# Patient Record
Sex: FEMALE | Race: WHITE | NOT HISPANIC OR LATINO | Employment: UNEMPLOYED | URBAN - METROPOLITAN AREA
[De-identification: names, ages, dates, MRNs, and addresses within clinical notes are randomized per-mention and may not be internally consistent; named-entity substitution may affect disease eponyms.]

---

## 2017-01-12 ENCOUNTER — TELEPHONE (OUTPATIENT)
Dept: INTERNAL MEDICINE | Facility: CLINIC | Age: 37
End: 2017-01-12

## 2017-01-12 DIAGNOSIS — Z00.00 ANNUAL PHYSICAL EXAM: Primary | ICD-10-CM

## 2017-01-12 NOTE — TELEPHONE ENCOUNTER
----- Message from Ottoniel Tucker sent at 1/12/2017 10:45 AM CST -----  Contact: Self [phone]  Type: Orders Request    What orders/ testing are being requested? Orders to have her Thyroid level check    Is there a future appointment scheduled for the patient with PCP? No    When?    Comments:Please advice    Thanks

## 2017-01-16 ENCOUNTER — TELEPHONE (OUTPATIENT)
Dept: INTERNAL MEDICINE | Facility: CLINIC | Age: 37
End: 2017-01-16

## 2017-01-16 NOTE — TELEPHONE ENCOUNTER
----- Message from Britt Paige sent at 1/16/2017  1:57 PM CST -----  Contact: patient on cell [phone]  lOy, Pt called last week and is having sx which indicate she may be having thyroid problems She currently takes thyroid medication. I see two sets of orders and wanted to be sure it was ok to schedule them. Please call pt to advise if this is all that needs to be scheduled to check her thyroid level.

## 2017-01-17 ENCOUNTER — LAB VISIT (OUTPATIENT)
Dept: LAB | Facility: HOSPITAL | Age: 37
End: 2017-01-17
Attending: INTERNAL MEDICINE
Payer: COMMERCIAL

## 2017-01-17 ENCOUNTER — TELEPHONE (OUTPATIENT)
Dept: INTERNAL MEDICINE | Facility: CLINIC | Age: 37
End: 2017-01-17

## 2017-01-17 DIAGNOSIS — E03.9 HYPOTHYROIDISM: ICD-10-CM

## 2017-01-17 LAB
T4 FREE SERPL-MCNC: 0.84 NG/DL
TSH SERPL DL<=0.005 MIU/L-ACNC: 2.53 UIU/ML

## 2017-01-17 PROCEDURE — 84439 ASSAY OF FREE THYROXINE: CPT

## 2017-01-17 PROCEDURE — 84443 ASSAY THYROID STIM HORMONE: CPT

## 2017-01-17 PROCEDURE — 36415 COLL VENOUS BLD VENIPUNCTURE: CPT | Mod: PO

## 2017-01-20 ENCOUNTER — TELEPHONE (OUTPATIENT)
Dept: INTERNAL MEDICINE | Facility: CLINIC | Age: 37
End: 2017-01-20

## 2017-01-20 RX ORDER — LEVOTHYROXINE SODIUM 50 UG/1
50 TABLET ORAL DAILY
Qty: 90 TABLET | Refills: 3 | Status: SHIPPED | OUTPATIENT
Start: 2017-01-20 | End: 2018-04-23 | Stop reason: SDUPTHER

## 2017-02-13 ENCOUNTER — TELEPHONE (OUTPATIENT)
Dept: OBSTETRICS AND GYNECOLOGY | Facility: CLINIC | Age: 37
End: 2017-02-13

## 2017-02-13 NOTE — TELEPHONE ENCOUNTER
Pt coming in for annual and wanted to discuss a possible hyst with Dr. Martin.  C/o severe cramping.  Took 4 advil that didn't help.  Rescheduled annual to 2/23.

## 2017-02-13 NOTE — TELEPHONE ENCOUNTER
Veronica pt - pt is scheduled for an annual on 3/14. She said she has been having problems with her period and the pain is becoming unbearable. She would like to discuss the option of a hysterectomy with .

## 2018-04-23 RX ORDER — LEVOTHYROXINE SODIUM 50 UG/1
50 TABLET ORAL DAILY
Qty: 90 TABLET | Refills: 0 | Status: SHIPPED | OUTPATIENT
Start: 2018-04-23 | End: 2018-06-26

## 2018-05-30 ENCOUNTER — TELEPHONE (OUTPATIENT)
Dept: OBSTETRICS AND GYNECOLOGY | Facility: CLINIC | Age: 38
End: 2018-05-30

## 2018-05-30 DIAGNOSIS — R10.2 PELVIC PAIN: Primary | ICD-10-CM

## 2018-05-30 NOTE — TELEPHONE ENCOUNTER
Dr glez pt calling, pt has been having a dull right side pelvic pain around her ovaries. Pt # [phone]

## 2018-05-30 NOTE — TELEPHONE ENCOUNTER
Veronica pt- states she has been experiencing a dull achy pain around her right ovary for a couple of weeks. Pain comes and goes but is not unbearable. Pt not on OCPs.    Pt scheduled annual appt earlier today for 6/6/18. Pt's last annual was 1/28/2016.    Notified pt that she should come in for annual and speak to Dr. Martin and then return for u/s + appt if Dr. Martin thought that was appropriate. Pt scheduled for u/s +appt on 6/18, aware of u/s prep.

## 2018-06-06 ENCOUNTER — OFFICE VISIT (OUTPATIENT)
Dept: OBSTETRICS AND GYNECOLOGY | Facility: CLINIC | Age: 38
End: 2018-06-06
Payer: COMMERCIAL

## 2018-06-06 VITALS
BODY MASS INDEX: 31.16 KG/M2 | SYSTOLIC BLOOD PRESSURE: 122 MMHG | WEIGHT: 193.88 LBS | DIASTOLIC BLOOD PRESSURE: 80 MMHG | HEIGHT: 66 IN

## 2018-06-06 DIAGNOSIS — Z12.31 ENCOUNTER FOR MAMMOGRAM TO ESTABLISH BASELINE MAMMOGRAM: ICD-10-CM

## 2018-06-06 DIAGNOSIS — Z11.51 SPECIAL SCREENING EXAMINATION FOR HUMAN PAPILLOMAVIRUS (HPV): ICD-10-CM

## 2018-06-06 DIAGNOSIS — Z12.31 VISIT FOR SCREENING MAMMOGRAM: ICD-10-CM

## 2018-06-06 DIAGNOSIS — Z11.51 SCREENING FOR HUMAN PAPILLOMAVIRUS: ICD-10-CM

## 2018-06-06 DIAGNOSIS — Z01.419 ENCOUNTER FOR GYNECOLOGICAL EXAMINATION: ICD-10-CM

## 2018-06-06 DIAGNOSIS — Z13.21 ENCOUNTER FOR VITAMIN DEFICIENCY SCREENING: ICD-10-CM

## 2018-06-06 DIAGNOSIS — Z12.4 SCREENING FOR CERVICAL CANCER: ICD-10-CM

## 2018-06-06 DIAGNOSIS — Z12.4 SCREENING FOR MALIGNANT NEOPLASM OF CERVIX: Primary | ICD-10-CM

## 2018-06-06 DIAGNOSIS — N94.6 DYSMENORRHEA: ICD-10-CM

## 2018-06-06 DIAGNOSIS — N92.0 MENORRHAGIA WITH REGULAR CYCLE: ICD-10-CM

## 2018-06-06 DIAGNOSIS — E03.9 ACQUIRED HYPOTHYROIDISM: ICD-10-CM

## 2018-06-06 DIAGNOSIS — Z00.00 PERIODIC HEALTH ASSESSMENT, GENERAL SCREENING, ADULT: ICD-10-CM

## 2018-06-06 PROCEDURE — 88175 CYTOPATH C/V AUTO FLUID REDO: CPT

## 2018-06-06 PROCEDURE — 87624 HPV HI-RISK TYP POOLED RSLT: CPT

## 2018-06-06 PROCEDURE — 99395 PREV VISIT EST AGE 18-39: CPT | Mod: S$GLB,,, | Performed by: OBSTETRICS & GYNECOLOGY

## 2018-06-06 PROCEDURE — 99999 PR PBB SHADOW E&M-EST. PATIENT-LVL IV: CPT | Mod: PBBFAC,,, | Performed by: OBSTETRICS & GYNECOLOGY

## 2018-06-06 RX ORDER — PHENTERMINE HYDROCHLORIDE 37.5 MG/1
18.75 TABLET ORAL 2 TIMES DAILY
Refills: 0 | COMMUNITY
Start: 2018-05-16 | End: 2018-06-26

## 2018-06-06 RX ORDER — PROGESTERONE 100 MG/1
100 CAPSULE ORAL DAILY
Qty: 60 CAPSULE | Refills: 3 | Status: SHIPPED | OUTPATIENT
Start: 2018-06-06 | End: 2019-05-15 | Stop reason: SDUPTHER

## 2018-06-06 NOTE — PROGRESS NOTES
Subjective:       Patient ID: Monica Javier is a 37 y.o. female.    Chief Complaint:  Well Woman (Annual Exam   --  Last Pap/Hpv 13, Negative )      History of Present Illness.  Monica Javier is a 37 y.o. female.  She has no breast or urinary symptoms.  She has no menorrhagia, oligomenorrhea, bleeding betweeen menses, postcoital bleeding, dysmenorrhea, pelvic pain, dyspareunia, vaginal dryness, vaginal discharge, or sexual complaints.  She complains of heavy periods with clots and bad cramps.  They last 4-5 days and are regular.  She has been having hip pain recently.    GYN & OB History  Patient's last menstrual period was 2018 (exact date).   Date of Last Pap: No result found    OB History    Para Term  AB Living   4 2 2   2 2   SAB TAB Ectopic Multiple Live Births   1       2      # Outcome Date GA Lbr Filemon/2nd Weight Sex Delivery Anes PTL Lv   4 Term 14 39w0d  3.685 kg (8 lb 2 oz) M CS-Unspec Spinal  NARENDRA   3 SAB 10/08/12 10w0d       FD      Birth Comments: Suction D&C (46 XX karyotype)    2 Molar 11              Birth Comments: Suction D&C   1 Term 09 40w0d  3.345 kg (7 lb 6 oz) F CS-Unspec Spinal  NARENDRA      Complications: Failure to Progress in Second Stage      Obstetric Comments   menarche 14       Past Medical History:   Diagnosis Date    Asthma     Bicornuate uterus     History of MTHFR mutation     1 copy of MTHFR C677T mutation 1 copy MTHFR [id number] mutation    Hypothyroidism     Hypo    Thrombophilia     1 copy of MTHFR C677T mutation 1 copy MTHFR [id number] mutation     Past Surgical History:   Procedure Laterality Date     SECTION  , 2014    x 2    DILATION AND CURETTAGE OF UTERUS  2011    Partial Mole    DILATION AND CURETTAGE OF UTERUS  10/08/2012    Missed Ab (46 XX karatype)    TUBAL LIGATION      with last delivery      Family History   Problem Relation Age of Onset    Diabetes Father     Hypertension Father      "Hyperlipidemia Father     Diabetes Sister         juvenile    Cancer Paternal Grandfather         throat; smoker and alcoholic    Throat cancer Paternal Grandfather     Hypertension Mother     Breast cancer Neg Hx     Colon cancer Neg Hx     Ovarian cancer Neg Hx      Social History   Substance Use Topics    Smoking status: Never Smoker    Smokeless tobacco: Never Used    Alcohol use 1.8 oz/week     3 Glasses of wine per week      Comment: Social        Current Outpatient Prescriptions:     ADIPEX-P 37.5 mg tablet, Take 18.75 mg by mouth 2 (two) times daily., Disp: , Rfl: 0    levothyroxine (SYNTHROID) 50 MCG tablet, TAKE 1 TABLET (50 MCG TOTAL) BY MOUTH ONCE DAILY., Disp: 90 tablet, Rfl: 0    progesterone (PROMETRIUM) 100 MG capsule, Take 1 capsule (100 mg total) by mouth once daily., Disp: 60 capsule, Rfl: 3    Review of patient's allergies indicates:  No Known Allergies    Review of Systems  Review of Systems   Constitutional: Negative for fatigue.   HENT: Negative for trouble swallowing.    Eyes: Negative for visual disturbance.   Respiratory: Negative for cough and shortness of breath.    Cardiovascular: Negative for chest pain.   Gastrointestinal: Negative for abdominal distention, abdominal pain, blood in stool, nausea and vomiting.   Genitourinary: Negative for difficulty urinating, dyspareunia, dysuria, flank pain, frequency, hematuria, pelvic pain, urgency, vaginal bleeding, vaginal discharge and vaginal pain.   Musculoskeletal: Positive for arthralgias.   Skin: Negative for rash.   Neurological: Negative for dizziness and headaches.   Psychiatric/Behavioral: Negative for sleep disturbance. The patient is not nervous/anxious.         Objective:     Vitals:    06/06/18 1425   BP: 122/80   Weight: 87.9 kg (193 lb 14.3 oz)   Height: 5' 6" (1.676 m)   PainSc: 0-No pain     Body mass index is 31.3 kg/m².      Physical Exam:   Constitutional: She is oriented to person, place, and time. Vital signs " are normal. She appears well-developed and well-nourished.    HENT:   Head: Normocephalic.     Neck: Normal range of motion. No thyromegaly present.     Pulmonary/Chest: Right breast exhibits no mass, no nipple discharge, no skin change, no tenderness and no swelling. Left breast exhibits no mass, no nipple discharge, no skin change, no tenderness and no swelling. Breasts are symmetrical.        Abdominal: Soft. Normal appearance and bowel sounds are normal. She exhibits no distension. There is no tenderness.     Genitourinary: Vagina normal and uterus normal. Pelvic exam was performed with patient supine. There is no rash, tenderness, lesion or injury on the right labia. There is no rash, tenderness, lesion or injury on the left labia. Cervix is normal. Right adnexum displays no mass, no tenderness and no fullness. Left adnexum displays no mass, no tenderness and no fullness. No erythema in the vagina. No vaginal discharge found. Cervix exhibits no motion tenderness and no discharge.           Musculoskeletal: Normal range of motion.      Lymphadenopathy:        Right: No inguinal and no supraclavicular adenopathy present.        Left: No inguinal and no supraclavicular adenopathy present.    Neurological: She is alert and oriented to person, place, and time.    Skin: Skin is warm and dry.    Psychiatric: She has a normal mood and affect.        Assessment/ Plan:     Screening for malignant neoplasm of cervix  -     Liquid-based pap smear, screening    Screening for human papillomavirus  -     HPV High Risk Genotypes, PCR    Encounter for mammogram to establish baseline mammogram  -     Mammo Digital Screening Bilat with Tomosynthesis CAD; Future; Expected date: 06/06/2018    Encounter for gynecological examination    Screening for cervical cancer    Special screening examination for human papillomavirus (HPV)    Visit for screening mammogram    Acquired hypothyroidism  -     Ambulatory Referral to Endocrinology  -      TSH; Future; Expected date: 06/06/2018  -     T4, free; Future; Expected date: 06/06/2018  -     T3, free; Future; Expected date: 06/06/2018    Periodic health assessment, general screening, adult  -     CBC auto differential; Future  -     Comprehensive metabolic panel; Future; Expected date: 06/06/2018  -     Hemoglobin A1c; Future; Expected date: 06/06/2018  -     Lipid panel; Future; Expected date: 06/06/2018  -     TSH; Future; Expected date: 06/06/2018  -     T4, free; Future; Expected date: 06/06/2018  -     T3, free; Future; Expected date: 06/06/2018    Encounter for vitamin deficiency screening  -     Vitamin D; Future; Expected date: 06/06/2018  -     Vitamin B12; Future; Expected date: 06/06/2018    Menorrhagia with regular cycle  -     US Pelvis Comp with Transvag NON-OB (xpd; Future; Expected date: 06/06/2018  -     progesterone (PROMETRIUM) 100 MG capsule; Take 1 capsule (100 mg total) by mouth once daily.  Dispense: 60 capsule; Refill: 3    Dysmenorrhea      Titrate progesterone to effect for menorrhagia control or side effects of drowsiness.  Will start at 100 mg and can increase by 100 mg.  DHEA SR 5 mg titrate by 5 mg q month until effect or to 50 mg.  MTHFR support  Nature Made fish oil 1200 + Vitamin D3 1000 IU 2 tabs twice daily.  Routine pap smears.  Self breast exam  Diet and exercise discussed.  Follow-up with me for u/s and to discuss labs.

## 2018-06-07 ENCOUNTER — PATIENT MESSAGE (OUTPATIENT)
Dept: OBSTETRICS AND GYNECOLOGY | Facility: CLINIC | Age: 38
End: 2018-06-07

## 2018-06-12 LAB
HPV HR 12 DNA CVX QL NAA+PROBE: NEGATIVE
HPV16 AG SPEC QL: NEGATIVE
HPV18 DNA SPEC QL NAA+PROBE: NEGATIVE

## 2018-06-18 ENCOUNTER — HOSPITAL ENCOUNTER (OUTPATIENT)
Dept: RADIOLOGY | Facility: OTHER | Age: 38
Discharge: HOME | End: 2018-06-18
Attending: OBSTETRICS & GYNECOLOGY
Payer: COMMERCIAL

## 2018-06-18 ENCOUNTER — OFFICE VISIT (OUTPATIENT)
Dept: OBSTETRICS AND GYNECOLOGY | Facility: CLINIC | Age: 38
End: 2018-06-18
Attending: OBSTETRICS & GYNECOLOGY
Payer: COMMERCIAL

## 2018-06-18 VITALS
DIASTOLIC BLOOD PRESSURE: 84 MMHG | HEIGHT: 67 IN | SYSTOLIC BLOOD PRESSURE: 122 MMHG | WEIGHT: 194.56 LBS | BODY MASS INDEX: 30.54 KG/M2

## 2018-06-18 DIAGNOSIS — N92.0 MENORRHAGIA WITH REGULAR CYCLE: Primary | ICD-10-CM

## 2018-06-18 DIAGNOSIS — R10.2 PELVIC PAIN: ICD-10-CM

## 2018-06-18 DIAGNOSIS — N94.6 DYSMENORRHEA: ICD-10-CM

## 2018-06-18 PROCEDURE — 99999 PR PBB SHADOW E&M-EST. PATIENT-LVL III: CPT | Mod: PBBFAC,,, | Performed by: OBSTETRICS & GYNECOLOGY

## 2018-06-18 PROCEDURE — 76830 TRANSVAGINAL US NON-OB: CPT | Mod: 26,,, | Performed by: RADIOLOGY

## 2018-06-18 PROCEDURE — 76830 TRANSVAGINAL US NON-OB: CPT | Mod: TC

## 2018-06-18 PROCEDURE — 76856 US EXAM PELVIC COMPLETE: CPT | Mod: 26,,, | Performed by: RADIOLOGY

## 2018-06-18 PROCEDURE — 99213 OFFICE O/P EST LOW 20 MIN: CPT | Mod: S$GLB,,, | Performed by: OBSTETRICS & GYNECOLOGY

## 2018-06-18 PROCEDURE — 3008F BODY MASS INDEX DOCD: CPT | Mod: CPTII,S$GLB,, | Performed by: OBSTETRICS & GYNECOLOGY

## 2018-06-18 NOTE — PROGRESS NOTES
"Pt is 38 y.o. here for evaluation of menorrhagia and dysmenorrhea. The pain is  10/10 in intensity first 2 days.  Onset was years ago occurring after she had BTL.  They are also very heavy the first 3-4 days with clots also since BTL.  She is going to start progesterone to see if that helps the flow.  Symptoms have been gradually worsening since. Aggravating factors: none. Alleviating factors: Advil takes the edge off.  Associated symptoms: none. The patient denies constipation, diarrhea, nausea, sweats and vomiting. Risk factors for pelvic/abdominal pain include prior pelvic surgery and bicornuate uterus.. Patient's last menstrual period was 06/10/2018 (exact date).  An U/S was done today but there was no mention of her bicornuate uterus.  I spoke to the radiologist and he said it was hard for the tech to get "good views".      OB History    Para Term  AB Living   4 2 2   2 2   SAB TAB Ectopic Multiple Live Births   1       2      # Outcome Date GA Lbr Filemon/2nd Weight Sex Delivery Anes PTL Lv   4 Term 14 39w0d  3.685 kg (8 lb 2 oz) M CS-Unspec Spinal  NARENDRA   3 SAB 10/08/12 10w0d       FD      Birth Comments: Suction D&C (46 XX karyotype)    2 Molar 11              Birth Comments: Suction D&C   1 Term 09 40w0d  3.345 kg (7 lb 6 oz) F CS-Unspec Spinal  NARENDRA      Complications: Failure to Progress in Second Stage      Obstetric Comments   menarche 14     Past Medical History:   Diagnosis Date    Asthma     Bicornuate uterus     History of MTHFR mutation     1 copy of MTHFR C677T mutation 1 copy MTHFR [id number] mutation    Hypothyroidism     Hypo    Thrombophilia     1 copy of MTHFR C677T mutation 1 copy MTHFR [id number] mutation     Past Surgical History:   Procedure Laterality Date     SECTION  , 2014    x 2    DILATION AND CURETTAGE OF UTERUS  2011    Partial Mole    DILATION AND CURETTAGE OF UTERUS  10/08/2012    Missed Ab (46 XX karatype)    TUBAL LIGATION      " with last delivery      Family History   Problem Relation Age of Onset    Diabetes Father     Hypertension Father     Hyperlipidemia Father     Diabetes Sister         juvenile    Kidney failure Sister     Cancer Paternal Grandfather         throat; smoker and alcoholic    Throat cancer Paternal Grandfather     Hypertension Mother     Breast cancer Neg Hx     Colon cancer Neg Hx     Ovarian cancer Neg Hx      Social History     Social History    Marital status:      Spouse name: N/A    Number of children: N/A    Years of education: N/A     Social History Main Topics    Smoking status: Never Smoker    Smokeless tobacco: Never Used    Alcohol use 1.8 oz/week     3 Glasses of wine per week      Comment: Social     Drug use: No    Sexual activity: Yes     Partners: Male     Birth control/ protection: Surgical      Comment: :    BTL       Other Topics Concern    None     Social History Narrative    None     Review of patient's allergies indicates:  No Known Allergies  Current Outpatient Prescriptions on File Prior to Visit   Medication Sig Dispense Refill    ADIPEX-P 37.5 mg tablet Take 18.75 mg by mouth 2 (two) times daily.  0    levothyroxine (SYNTHROID) 50 MCG tablet TAKE 1 TABLET (50 MCG TOTAL) BY MOUTH ONCE DAILY. 90 tablet 0    progesterone (PROMETRIUM) 100 MG capsule Take 1 capsule (100 mg total) by mouth once daily. 60 capsule 3     No current facility-administered medications on file prior to visit.        Review of Systems:  General: No fever, chills, fatigue or weight loss.  Chest: No chest pain, shortness of breath, or palpitations.  Breast: No pain, masses, or nipple discharge.  Vulva: No pain, lesions, or itching.  Vagina: No relaxation, pain, itching, discharge, or lesions.  Abdomen: No pain, nausea, vomiting, diarrhea, or constipation.  Urinary: No incontinence, nocturia, frequency, or dysuria.  Extremities:  No leg cramps, edema, or calf pain.  Neurologic: No  "headaches, dizziness, or visual changes.    Vitals:  Vitals:    06/18/18 0942   BP: 122/84   Weight: 88.3 kg (194 lb 8.9 oz)   Height: 5' 7" (1.702 m)   PainSc: 0-No pain     Body mass index is 30.47 kg/m².    Assessment and Plan:  Menorrhagia with regular cycle  -     US Pelvis Comp with Transvag NON-OB (xpd; Future; Expected date: 06/18/2018    Dysmenorrhea  -     US Pelvis Comp with Transvag NON-OB (xpd; Future; Expected date: 06/18/2018    Start progesterone 100 mg QHS  Menstrual diary  Will have [facility] redo U/S.  Radiologist agreed not to charge for the scan today because it was not adequate.  I spent 15 minutes face to face with the patient today.  "

## 2018-06-20 ENCOUNTER — LAB VISIT (OUTPATIENT)
Dept: LAB | Facility: HOSPITAL | Age: 38
End: 2018-06-20
Attending: OBSTETRICS & GYNECOLOGY
Payer: COMMERCIAL

## 2018-06-20 DIAGNOSIS — Z13.21 ENCOUNTER FOR VITAMIN DEFICIENCY SCREENING: ICD-10-CM

## 2018-06-20 DIAGNOSIS — Z00.00 PERIODIC HEALTH ASSESSMENT, GENERAL SCREENING, ADULT: ICD-10-CM

## 2018-06-20 DIAGNOSIS — E03.9 ACQUIRED HYPOTHYROIDISM: ICD-10-CM

## 2018-06-20 LAB
25(OH)D3+25(OH)D2 SERPL-MCNC: 27 NG/ML
ALBUMIN SERPL BCP-MCNC: 4 G/DL
ALP SERPL-CCNC: 87 U/L
ALT SERPL W/O P-5'-P-CCNC: 22 U/L
ANION GAP SERPL CALC-SCNC: 8 MMOL/L
AST SERPL-CCNC: 20 U/L
BASOPHILS # BLD AUTO: 0.06 K/UL
BASOPHILS NFR BLD: 0.8 %
BILIRUB SERPL-MCNC: 0.2 MG/DL
BUN SERPL-MCNC: 13 MG/DL
CALCIUM SERPL-MCNC: 9.6 MG/DL
CHLORIDE SERPL-SCNC: 105 MMOL/L
CHOLEST SERPL-MCNC: 188 MG/DL
CHOLEST/HDLC SERPL: 3.7 {RATIO}
CO2 SERPL-SCNC: 26 MMOL/L
CREAT SERPL-MCNC: 0.7 MG/DL
DIFFERENTIAL METHOD: ABNORMAL
EOSINOPHIL # BLD AUTO: 0.2 K/UL
EOSINOPHIL NFR BLD: 2.6 %
ERYTHROCYTE [DISTWIDTH] IN BLOOD BY AUTOMATED COUNT: 12.3 %
EST. GFR  (AFRICAN AMERICAN): >60 ML/MIN/1.73 M^2
EST. GFR  (NON AFRICAN AMERICAN): >60 ML/MIN/1.73 M^2
ESTIMATED AVG GLUCOSE: 85 MG/DL
GLUCOSE SERPL-MCNC: 90 MG/DL
HBA1C MFR BLD HPLC: 4.6 %
HCT VFR BLD AUTO: 38.6 %
HDLC SERPL-MCNC: 51 MG/DL
HDLC SERPL: 27.1 %
HGB BLD-MCNC: 12.9 G/DL
IMM GRANULOCYTES # BLD AUTO: 0.03 K/UL
IMM GRANULOCYTES NFR BLD AUTO: 0.4 %
LDLC SERPL CALC-MCNC: 108.2 MG/DL
LYMPHOCYTES # BLD AUTO: 2.3 K/UL
LYMPHOCYTES NFR BLD: 31.5 %
MCH RBC QN AUTO: 31.2 PG
MCHC RBC AUTO-ENTMCNC: 33.4 G/DL
MCV RBC AUTO: 93 FL
MONOCYTES # BLD AUTO: 0.6 K/UL
MONOCYTES NFR BLD: 7.9 %
NEUTROPHILS # BLD AUTO: 4.2 K/UL
NEUTROPHILS NFR BLD: 56.8 %
NONHDLC SERPL-MCNC: 137 MG/DL
NRBC BLD-RTO: 0 /100 WBC
PLATELET # BLD AUTO: 322 K/UL
PMV BLD AUTO: 10 FL
POTASSIUM SERPL-SCNC: 4.4 MMOL/L
PROT SERPL-MCNC: 7.4 G/DL
RBC # BLD AUTO: 4.14 M/UL
SODIUM SERPL-SCNC: 139 MMOL/L
T3FREE SERPL-MCNC: 2.4 PG/ML
T4 FREE SERPL-MCNC: 0.75 NG/DL
TRIGL SERPL-MCNC: 144 MG/DL
TSH SERPL DL<=0.005 MIU/L-ACNC: 5.33 UIU/ML
VIT B12 SERPL-MCNC: 367 PG/ML
WBC # BLD AUTO: 7.33 K/UL

## 2018-06-20 PROCEDURE — 84439 ASSAY OF FREE THYROXINE: CPT

## 2018-06-20 PROCEDURE — 82306 VITAMIN D 25 HYDROXY: CPT

## 2018-06-20 PROCEDURE — 85025 COMPLETE CBC W/AUTO DIFF WBC: CPT

## 2018-06-20 PROCEDURE — 80061 LIPID PANEL: CPT

## 2018-06-20 PROCEDURE — 83036 HEMOGLOBIN GLYCOSYLATED A1C: CPT

## 2018-06-20 PROCEDURE — 84443 ASSAY THYROID STIM HORMONE: CPT

## 2018-06-20 PROCEDURE — 84481 FREE ASSAY (FT-3): CPT

## 2018-06-20 PROCEDURE — 80053 COMPREHEN METABOLIC PANEL: CPT

## 2018-06-20 PROCEDURE — 82607 VITAMIN B-12: CPT

## 2018-06-26 ENCOUNTER — LAB VISIT (OUTPATIENT)
Dept: LAB | Facility: HOSPITAL | Age: 38
End: 2018-06-26
Attending: OBSTETRICS & GYNECOLOGY
Payer: COMMERCIAL

## 2018-06-26 ENCOUNTER — OFFICE VISIT (OUTPATIENT)
Dept: OBSTETRICS AND GYNECOLOGY | Facility: CLINIC | Age: 38
End: 2018-06-26
Payer: COMMERCIAL

## 2018-06-26 VITALS
WEIGHT: 200.63 LBS | HEIGHT: 67 IN | SYSTOLIC BLOOD PRESSURE: 134 MMHG | BODY MASS INDEX: 31.49 KG/M2 | DIASTOLIC BLOOD PRESSURE: 86 MMHG

## 2018-06-26 DIAGNOSIS — N92.0 MENORRHAGIA WITH REGULAR CYCLE: ICD-10-CM

## 2018-06-26 DIAGNOSIS — N94.6 DYSMENORRHEA: ICD-10-CM

## 2018-06-26 DIAGNOSIS — E03.9 ACQUIRED HYPOTHYROIDISM: ICD-10-CM

## 2018-06-26 DIAGNOSIS — E03.9 ACQUIRED HYPOTHYROIDISM: Primary | ICD-10-CM

## 2018-06-26 LAB — THYROPEROXIDASE IGG SERPL-ACNC: 307.1 IU/ML

## 2018-06-26 PROCEDURE — 99499 UNLISTED E&M SERVICE: CPT | Mod: S$GLB,,, | Performed by: OBSTETRICS & GYNECOLOGY

## 2018-06-26 PROCEDURE — 99999 PR PBB SHADOW E&M-EST. PATIENT-LVL III: CPT | Mod: PBBFAC,,, | Performed by: OBSTETRICS & GYNECOLOGY

## 2018-06-26 PROCEDURE — 3008F BODY MASS INDEX DOCD: CPT | Mod: CPTII,S$GLB,, | Performed by: OBSTETRICS & GYNECOLOGY

## 2018-06-26 PROCEDURE — 86376 MICROSOMAL ANTIBODY EACH: CPT

## 2018-06-26 RX ORDER — LEVOTHYROXINE SODIUM 75 UG/1
75 TABLET ORAL DAILY
Qty: 30 TABLET | Refills: 11 | Status: SHIPPED | OUTPATIENT
Start: 2018-06-26 | End: 2018-07-25 | Stop reason: SDUPTHER

## 2018-06-26 RX ORDER — IBUPROFEN 800 MG/1
800 TABLET ORAL EVERY 8 HOURS PRN
Qty: 60 TABLET | Refills: 6 | Status: SHIPPED | OUTPATIENT
Start: 2018-06-26 | End: 2019-06-26

## 2018-06-26 NOTE — PROGRESS NOTES
History of Present Illness:  Patient is a 38 y.o. female who complains of heavy and painful periods. The pain is  10/10 in intensity first 2 days.  Onset was years ago occurring after she had BTL.  They are also very heavy the first 3-4 days with clots also since BTL.  She just started progesterone 100 mg 1 week ago.  Her TSH is elevated at 5.3.  She currently takes 50 mcg daily of synthroid.  She was supposed to see an endocrinologist 2 years ago per PCP but never did due to scheduling issues.  She also had a thyroid U/d which showed a nodule.  The report said a FNA was recommended but the patient was unaware. Patient's last menstrual period was 06/10/2018 (exact date).    Trans abdominal and transvaginal ultrasound of the pelvis:  6/26/18  FINDINGS:  The uterus is measures approximately 8.4 cm in length by 5.6 x 6.7 cm in transverse dimension.  The endometrium measures the endometrium is poorly visualized measuring approximately 1.2 cm in greatest thickness with questioned bicornuate or septate uterus.  Right ovary measures 3.2 by 2.7 x 2.2 cm with a few cystic follicles.  Left ovary is identified measuring 3.3 by 2.8 by 2.6 cm with a few cystic follicles.  There is a 2.2 cm probable dominant follicle  There is arterial and venous flow identified in the ovaries bilaterally.  No adnexal mass or free fluid in the pelvis.   Impression       Normal size uterus with poorly visualized endometrium measuring approximately 1.2 cm in thickness.  There is questioned bicornuate or septated configuration of the uterus.  This could be further evaluate with MR imaging.  No adnexal mass or free fluid in the pelvis with 2.2 cm left ovarian dominant follicle.       Lab Visit on 06/20/2018   Component Date Value Ref Range Status    WBC 06/20/2018 7.33  3.90 - 12.70 K/uL Final    RBC 06/20/2018 4.14  4.00 - 5.40 M/uL Final    Hemoglobin 06/20/2018 12.9  12.0 - 16.0 g/dL Final    Hematocrit 06/20/2018 38.6  37.0 - 48.5 % Final     MCV 06/20/2018 93  82 - 98 fL Final    MCH 06/20/2018 31.2* 27.0 - 31.0 pg Final    MCHC 06/20/2018 33.4  32.0 - 36.0 g/dL Final    RDW 06/20/2018 12.3  11.5 - 14.5 % Final    Platelets 06/20/2018 322  150 - 350 K/uL Final    MPV 06/20/2018 10.0  9.2 - 12.9 fL Final    Immature Granulocytes 06/20/2018 0.4  0.0 - 0.5 % Final    Gran # (ANC) 06/20/2018 4.2  1.8 - 7.7 K/uL Final    Immature Grans (Abs) 06/20/2018 0.03  0.00 - 0.04 K/uL Final    Lymph # 06/20/2018 2.3  1.0 - 4.8 K/uL Final    Mono # 06/20/2018 0.6  0.3 - 1.0 K/uL Final    Eos # 06/20/2018 0.2  0.0 - 0.5 K/uL Final    Baso # 06/20/2018 0.06  0.00 - 0.20 K/uL Final    nRBC 06/20/2018 0  0 /100 WBC Final    Gran% 06/20/2018 56.8  38.0 - 73.0 % Final    Lymph% 06/20/2018 31.5  18.0 - 48.0 % Final    Mono% 06/20/2018 7.9  4.0 - 15.0 % Final    Eosinophil% 06/20/2018 2.6  0.0 - 8.0 % Final    Basophil% 06/20/2018 0.8  0.0 - 1.9 % Final    Differential Method 06/20/2018 Automated   Final    Sodium 06/20/2018 139  136 - 145 mmol/L Final    Potassium 06/20/2018 4.4  3.5 - 5.1 mmol/L Final    Chloride 06/20/2018 105  95 - 110 mmol/L Final    CO2 06/20/2018 26  23 - 29 mmol/L Final    Glucose 06/20/2018 90  70 - 110 mg/dL Final    BUN, Bld 06/20/2018 13  6 - 20 mg/dL Final    Creatinine 06/20/2018 0.7  0.5 - 1.4 mg/dL Final    Calcium 06/20/2018 9.6  8.7 - 10.5 mg/dL Final    Total Protein 06/20/2018 7.4  6.0 - 8.4 g/dL Final    Albumin 06/20/2018 4.0  3.5 - 5.2 g/dL Final    Total Bilirubin 06/20/2018 0.2  0.1 - 1.0 mg/dL Final    Alkaline Phosphatase 06/20/2018 87  55 - 135 U/L Final    AST 06/20/2018 20  10 - 40 U/L Final    ALT 06/20/2018 22  10 - 44 U/L Final    Anion Gap 06/20/2018 8  8 - 16 mmol/L Final    eGFR if African American 06/20/2018 >60.0  >60 mL/min/1.73 m^2 Final    eGFR if non African American 06/20/2018 >60.0  >60 mL/min/1.73 m^2 Final    Hemoglobin A1C 06/20/2018 4.6  4.0 - 5.6 % Final    Estimated Avg  Glucose 2018 85  68 - 131 mg/dL Final    Cholesterol 2018 188  120 - 199 mg/dL Final    Triglycerides 2018 144  30 - 150 mg/dL Final    HDL 2018 51  40 - 75 mg/dL Final    LDL Cholesterol 2018 108.2  63.0 - 159.0 mg/dL Final    HDL/Chol Ratio 2018 27.1  20.0 - 50.0 % Final    Total Cholesterol/HDL Ratio 2018 3.7  2.0 - 5.0 Final    Non-HDL Cholesterol 2018 137  mg/dL Final    TSH 2018 5.333* 0.400 - 4.000 uIU/mL Final    Free T4 2018 0.75  0.71 - 1.51 ng/dL Final    T3, Free 2018 2.4  2.3 - 4.2 pg/mL Final    Vit D, 25-Hydroxy 2018 27* 30 - 96 ng/mL Final    Vitamin B-12 2018 367  210 - 950 pg/mL Final   Office Visit on 2018   Component Date Value Ref Range Status    HPV High Risk type 16, PCR 2018 Negative  Negative Final    HPV High Risk type 18, PCR 2018 Negative  Negative Final    HPV other High Risk types, PCR 2018 Negative  Negative Final       Past Medical History:   Diagnosis Date    Asthma     Bicornuate uterus     History of MTHFR mutation     1 copy of MTHFR C677T mutation 1 copy MTHFR [id number] mutation    Hypothyroidism     Hypo    Thrombophilia     1 copy of MTHFR C677T mutation 1 copy MTHFR [id number] mutation     Past Surgical History:   Procedure Laterality Date     SECTION  2009, 2014    x 2    DILATION AND CURETTAGE OF UTERUS  2011    Partial Mole    DILATION AND CURETTAGE OF UTERUS  10/08/2012    Missed Ab (46 XX karatype)    TUBAL LIGATION      with last delivery      Family History   Problem Relation Age of Onset    Diabetes Father     Hypertension Father     Hyperlipidemia Father     Diabetes Sister         juvenile    Kidney failure Sister     Cancer Paternal Grandfather         throat; smoker and alcoholic    Throat cancer Paternal Grandfather     Hypertension Mother     Breast cancer Neg Hx     Colon cancer Neg Hx     Ovarian cancer Neg Hx   "    Social History     Social History    Marital status:      Spouse name: N/A    Number of children: N/A    Years of education: N/A     Social History Main Topics    Smoking status: Never Smoker    Smokeless tobacco: Never Used    Alcohol use 1.8 oz/week     3 Glasses of wine per week      Comment: Social     Drug use: No    Sexual activity: Yes     Partners: Male     Birth control/ protection: Surgical      Comment: :    BTL       Other Topics Concern    None     Social History Narrative    None     Review of patient's allergies indicates:  No Known Allergies  Current Outpatient Prescriptions on File Prior to Visit   Medication Sig Dispense Refill    mv,Ca,min/iron/FA/guarana/caff (ONE-A-DAY WOMEN'S ACTIVE ORAL) Take by mouth.      progesterone (PROMETRIUM) 100 MG capsule Take 1 capsule (100 mg total) by mouth once daily. 60 capsule 3    [DISCONTINUED] levothyroxine (SYNTHROID) 50 MCG tablet TAKE 1 TABLET (50 MCG TOTAL) BY MOUTH ONCE DAILY. 90 tablet 0    [DISCONTINUED] ADIPEX-P 37.5 mg tablet Take 18.75 mg by mouth 2 (two) times daily.  0     No current facility-administered medications on file prior to visit.        Review of Systems:  General: No fever, chills, fatigue or weight loss.  Chest: No chest pain, shortness of breath, or palpitations.  Breast: No pain, masses, or nipple discharge.  Vulva: No pain, lesions, or itching.  Vagina: No relaxation, pain, itching, discharge, or lesions.  Abdomen: No pain, nausea, vomiting, diarrhea, or constipation.  Urinary: No incontinence, nocturia, frequency, or dysuria.  Extremities:  No leg cramps, edema, or calf pain.  Neurologic: No headaches, dizziness, or visual changes.    Vitals:    06/26/18 1548   BP: 134/86   Weight: 91 kg (200 lb 9.9 oz)   Height: 5' 7" (1.702 m)   PainSc: 0-No pain     Body mass index is 31.42 kg/m².    Assessment and Plan:  Acquired hypothyroidism  -     Cancel: THYROID PEROXIDASE ANTIBODY; Future; Expected date: " 06/26/2018  -     US Thyroid; Future; Expected date: 06/26/2018  -     levothyroxine (SYNTHROID) 75 MCG tablet; Take 1 tablet (75 mcg total) by mouth once daily.  Dispense: 30 tablet; Refill: 11  -     Cancel: THYROTROPIN RECEPTOR ANTIBODY; Future; Expected date: 06/26/2018  -     Cancel: THYROGLOBULIN; Future; Expected date: 06/26/2018  -     THYROID PEROXIDASE ANTIBODY; Future; Expected date: 06/26/2018    Dysmenorrhea  -     ibuprofen (ADVIL,MOTRIN) 800 MG tablet; Take 1 tablet (800 mg total) by mouth every 8 (eight) hours as needed for Pain.  Dispense: 60 tablet; Refill: 6    Menorrhagia with regular cycle    Pelvic ultrasound reviewed with patient.  Message sent to Dr. Harmon for appointment.  Recheck TSH in 6-8 weeks if hasn't seen endocrinologist yet.  I spent 25 minutes face to face with the patient today.  F/U 2 months

## 2018-06-27 ENCOUNTER — PATIENT MESSAGE (OUTPATIENT)
Dept: OBSTETRICS AND GYNECOLOGY | Facility: CLINIC | Age: 38
End: 2018-06-27

## 2018-06-28 ENCOUNTER — PATIENT MESSAGE (OUTPATIENT)
Dept: OBSTETRICS AND GYNECOLOGY | Facility: CLINIC | Age: 38
End: 2018-06-28

## 2018-07-25 ENCOUNTER — OFFICE VISIT (OUTPATIENT)
Dept: ENDOCRINOLOGY | Facility: CLINIC | Age: 38
End: 2018-07-25
Payer: COMMERCIAL

## 2018-07-25 VITALS
HEART RATE: 81 BPM | DIASTOLIC BLOOD PRESSURE: 84 MMHG | BODY MASS INDEX: 30.93 KG/M2 | WEIGHT: 197.06 LBS | HEIGHT: 67 IN | SYSTOLIC BLOOD PRESSURE: 120 MMHG

## 2018-07-25 DIAGNOSIS — E03.9 ACQUIRED HYPOTHYROIDISM: ICD-10-CM

## 2018-07-25 DIAGNOSIS — E04.2 MULTIPLE THYROID NODULES: ICD-10-CM

## 2018-07-25 PROCEDURE — 99204 OFFICE O/P NEW MOD 45 MIN: CPT | Mod: S$GLB,,, | Performed by: INTERNAL MEDICINE

## 2018-07-25 PROCEDURE — 3008F BODY MASS INDEX DOCD: CPT | Mod: CPTII,S$GLB,, | Performed by: INTERNAL MEDICINE

## 2018-07-25 PROCEDURE — 99999 PR PBB SHADOW E&M-EST. PATIENT-LVL III: CPT | Mod: PBBFAC,,, | Performed by: INTERNAL MEDICINE

## 2018-07-25 RX ORDER — CHOLECALCIFEROL (VITAMIN D3) 25 MCG
1000 TABLET ORAL DAILY
COMMUNITY

## 2018-07-25 RX ORDER — LEVOTHYROXINE SODIUM 75 UG/1
75 TABLET ORAL DAILY
Qty: 30 TABLET | Refills: 11 | Status: SHIPPED | OUTPATIENT
Start: 2018-07-25 | End: 2018-09-26

## 2018-07-25 NOTE — PROGRESS NOTES
Clinic Note  2018      Subjective:       Patient ID: Monica Javier is a 38 y.o. female being seen for hypothyroidism evaluation.    Chief Complaint: Hypothyroidism    HPI     Ms. Javier is a pleasant 37 yo female who comes to the clinic today for hypothyroidism evaluation. She was originally diagnosed with Hypothyroidism at the age of 18 when she presented with a lot fatigue. Pt has since been on Synthroid medication with varying doses. Recent labs ordered by her OBGYN revealed subclinical hypothyroidism which prompted her to seek further evaluation. Currently denies cold/ heat intolerance, fatigue, constipation, hair thinning and palpitations. She denies any previous radiation therapy to her head or neck.     Current regiment  Synthroid 75 mcg, denies any adverse medication effect.    Previous Thyroid US () showed a heterogenous thyroid, however there was no further follow up.    Family History is positive for Hashimoto's thyroidism in her mother and sister. Denies any family hx of thyroid cancer.    Past Medical History:   Diagnosis Date    Asthma     Bicornuate uterus     History of MTHFR mutation     1 copy of MTHFR C677T mutation 1 copy MTHFR [id number] mutation    Hypothyroidism     Hypo    Thrombophilia     1 copy of MTHFR C677T mutation 1 copy MTHFR [id number] mutation       Past Surgical History:   Procedure Laterality Date     SECTION  , 2014    x 2    DILATION AND CURETTAGE OF UTERUS  2011    Partial Mole    DILATION AND CURETTAGE OF UTERUS  10/08/2012    Missed Ab (46 XX karatype)    TUBAL LIGATION      with last delivery        Family History   Problem Relation Age of Onset    Diabetes Father     Hypertension Father     Hyperlipidemia Father     Diabetes Sister         juvenile    Kidney failure Sister     Cancer Paternal Grandfather         throat; smoker and alcoholic    Throat cancer Paternal Grandfather     Hypertension Mother     Breast cancer Neg Hx      Colon cancer Neg Hx     Ovarian cancer Neg Hx        Social History     Social History    Marital status:      Spouse name: N/A    Number of children: N/A    Years of education: N/A     Social History Main Topics    Smoking status: Never Smoker    Smokeless tobacco: Never Used    Alcohol use 1.8 oz/week     3 Glasses of wine per week      Comment: Social     Drug use: No    Sexual activity: Yes     Partners: Male     Birth control/ protection: Surgical      Comment: :    BTL       Other Topics Concern    None     Social History Narrative    None       Patient's Medications   New Prescriptions    No medications on file   Previous Medications    IBUPROFEN (ADVIL,MOTRIN) 800 MG TABLET    Take 1 tablet (800 mg total) by mouth every 8 (eight) hours as needed for Pain.    MV,CA,MIN/IRON/FA/GUARANA/CAFF (ONE-A-DAY WOMEN'S ACTIVE ORAL)    Take by mouth.    PROGESTERONE (PROMETRIUM) 100 MG CAPSULE    Take 1 capsule (100 mg total) by mouth once daily.    VITAMIN D 1000 UNITS TAB    Take 1,000 Units by mouth once daily.   Modified Medications    Modified Medication Previous Medication    LEVOTHYROXINE (SYNTHROID) 75 MCG TABLET levothyroxine (SYNTHROID) 75 MCG tablet       Take 1 tablet (75 mcg total) by mouth once daily.    Take 1 tablet (75 mcg total) by mouth once daily.   Discontinued Medications    No medications on file       Patient Active Problem List    Diagnosis Date Noted    Multiple thyroid nodules 07/25/2018    Menorrhagia with regular cycle 06/06/2018    Dysmenorrhea 06/06/2018    Hypothyroidism due to Hashimoto's thyroiditis 02/03/2016    Primary hypercoagulable state 01/28/2016     Note: Unchanged - - 1 copy of MTHFR C677T mutation and 1 copy of MTHFR V3259O mutation      Congenital uterine anomaly 06/11/2013     Note: Unchanged - - Bicornuate Uterus         Review of Systems   Constitutional: Negative for chills, fever and malaise/fatigue.   Eyes: Negative for blurred vision  "and double vision.   Respiratory: Negative for shortness of breath and wheezing.    Cardiovascular: Negative for chest pain and palpitations.   Gastrointestinal: Negative for abdominal pain, constipation, diarrhea and vomiting.   Neurological: Negative for dizziness and weakness.           Objective:      /84 (BP Location: Right arm, Patient Position: Sitting)   Pulse 81   Ht 5' 7" (1.702 m)   Wt 89.4 kg (197 lb 1.5 oz)   BMI 30.87 kg/m²   Body mass index is 30.87 kg/m².    Physical Exam   Constitutional: She is oriented to person, place, and time.   HENT:   Head: Normocephalic and atraumatic.   Eyes: Pupils are equal, round, and reactive to light.   Neck: Normal range of motion. No thyromegaly (Right slobe slightly larger than left lobe) present.   Cardiovascular: Normal rate, regular rhythm and normal heart sounds.    Pulmonary/Chest: Effort normal and breath sounds normal. No respiratory distress.   Abdominal: Soft. She exhibits no distension. There is no tenderness.   Musculoskeletal: Normal range of motion. She exhibits no edema or tenderness.   Neurological: She is alert and oriented to person, place, and time. She displays normal reflexes. She exhibits normal muscle tone.   Skin: Skin is warm.       Results for WANG FRANKLIN (MRN [id number]) as of 7/25/2018 11:04   Ref. Range 6/20/2018 08:13 6/26/2018 16:45   TSH Latest Ref Range: 0.400 - 4.000 uIU/mL 5.333 (H)    T3, Free Latest Ref Range: 2.3 - 4.2 pg/mL 2.4    Free T4 Latest Ref Range: 0.71 - 1.51 ng/dL 0.75    Thyroperoxidase Antibodies Latest Ref Range: <6.0 IU/mL  307.1 (H)      Results for WANG FRANKLIN (MRN [id number]) as of 7/25/2018 11:04   Ref. Range 6/20/2018 08:13   Sodium Latest Ref Range: 136 - 145 mmol/L 139   Potassium Latest Ref Range: 3.5 - 5.1 mmol/L 4.4   Chloride Latest Ref Range: 95 - 110 mmol/L 105   CO2 Latest Ref Range: 23 - 29 mmol/L 26   Anion Gap Latest Ref Range: 8 - 16 mmol/L 8   BUN, Bld Latest Ref Range: 6 - 20 " mg/dL 13   Creatinine Latest Ref Range: 0.5 - 1.4 mg/dL 0.7   eGFR if non African American Latest Ref Range: >60 mL/min/1.73 m^2 >60.0   eGFR if African American Latest Ref Range: >60 mL/min/1.73 m^2 >60.0   Glucose Latest Ref Range: 70 - 110 mg/dL 90   Calcium Latest Ref Range: 8.7 - 10.5 mg/dL 9.6   Alkaline Phosphatase Latest Ref Range: 55 - 135 U/L 87   Total Protein Latest Ref Range: 6.0 - 8.4 g/dL 7.4   Albumin Latest Ref Range: 3.5 - 5.2 g/dL 4.0   Total Bilirubin Latest Ref Range: 0.1 - 1.0 mg/dL 0.2   AST Latest Ref Range: 10 - 40 U/L 20   ALT Latest Ref Range: 10 - 44 U/L 22   Triglycerides Latest Ref Range: 30 - 150 mg/dL 144     Results for WANG JAVIER (MRN [id number]) as of 7/25/2018 11:04   Ref. Range 6/20/2018 08:13   Cholesterol Latest Ref Range: 120 - 199 mg/dL 188   HDL Latest Ref Range: 40 - 75 mg/dL 51   LDL Cholesterol Latest Ref Range: 63.0 - 159.0 mg/dL 108.2   Total Cholesterol/HDL Ratio Latest Ref Range: 2.0 - 5.0  3.7   Triglycerides Latest Ref Range: 30 - 150 mg/dL 144     Thyroid Ultrasound 5/20/2016 - In this mildly heterogeneous thyroid gland, there is a more distinct nodular area within the right posterior thyroid.  Although this may be somewhat technically difficult to sample, further evaluation with fine needle aspiration recommended.      Assessment:         1. Multiple thyroid nodules     2. Acquired hypothyroidism  TSH    levothyroxine (SYNTHROID) 75 MCG tablet         Plan:         Wang Javier is a 38 y.o. female being seen for hypothyroidism evaluation.      1. Hypothyroidism 2/2 Hashimoto's Hypothyroiditis      - Order TSH       - Continue Synthroid 75 mcg      - Pt advised to return for evaluation of new or worsening symptoms     2.  Thyroid Nodule      - Follow up on repeat Thyroid ultrasound          Patient seen and plan of care discussed with Dr. Kelsey Harmon MD.    Bindu Mary,   Internal Medicine, PGY-1

## 2018-07-25 NOTE — PATIENT INSTRUCTIONS
Labs in one month to check your dose of thyroid hormone.     Ultrasound of thyroid - schedule at your convenience.

## 2018-07-25 NOTE — LETTER
July 25, 2018      Jenise Martin MD  [address]  [address]  [facility] [address]           [facility]Diab[facility]Metab  [address]  [facility] [address]  Phone: [phone]  Fax: [phone]          Patient: Monica Javier   MR Number: [id number]   YOB: 1980   Date of Visit: 7/25/2018       Dear Dr. Jenise Martin:    Thank you for referring Monica Javier to me for evaluation. Attached you will find relevant portions of my assessment and plan of care.    If you have questions, please do not hesitate to call me. I look forward to following Monica Javier along with you.    Sincerely,    Kelsey Harmon MD    Enclosure  CC:  No Recipients    If you would like to receive this communication electronically, please contact [email or web address] or [phone] to request more information on [facility] access.    For providers and/or their staff who would like to refer a patient to [facility], please contact us through our one-stop-shop provider referral line, [facility], at [phone].    If you feel you have received this communication in error or would no longer like to receive these types of communications, please e-mail [email or web address]

## 2018-07-25 NOTE — PROGRESS NOTES
Ms. Javier is a 38 year old woman who is here for evaluation of hashimoto's hypothyroidism and ultrasound with a distinct nodular area done two years ago.     Started levothyroxine 75 mcg one month ago, will repeat labs in one month.   Has US ordered to schedule today.     If TSH normal -- continue dose. Repeat labs and visit in one year  Will review ultrasound once complete.     I have personally taken the history and examined this patient and agree with the resident's note as stated above.

## 2018-08-03 DIAGNOSIS — E04.2 MULTIPLE THYROID NODULES: Primary | ICD-10-CM

## 2018-08-06 ENCOUNTER — HOSPITAL ENCOUNTER (OUTPATIENT)
Dept: RADIOLOGY | Facility: HOSPITAL | Age: 38
Discharge: HOME | End: 2018-08-06
Attending: INTERNAL MEDICINE
Payer: COMMERCIAL

## 2018-08-06 DIAGNOSIS — E04.2 MULTIPLE THYROID NODULES: ICD-10-CM

## 2018-08-06 PROCEDURE — 76536 US EXAM OF HEAD AND NECK: CPT | Mod: TC

## 2018-08-06 PROCEDURE — 76536 US EXAM OF HEAD AND NECK: CPT | Mod: 26,,, | Performed by: RADIOLOGY

## 2018-08-10 ENCOUNTER — TELEPHONE (OUTPATIENT)
Dept: ENDOCRINOLOGY | Facility: CLINIC | Age: 38
End: 2018-08-10

## 2018-08-10 NOTE — TELEPHONE ENCOUNTER
The thyroid gland is small and heterogeneous with increased vascularity consistent with thyroiditis.  The right lobe measures 4.1 x 1.6 x 1.3 cm.  The left lobe measures 3.5 x 1.4 x 1.2 cm.  Due to the marked thyroid heterogeneity, evaluation for focal nodule is limited no definite nodule is identified.    Message sent to patient.

## 2018-08-13 RX ORDER — LEVOTHYROXINE SODIUM 50 UG/1
50 TABLET ORAL DAILY
Qty: 90 TABLET | Refills: 0 | Status: SHIPPED | OUTPATIENT
Start: 2018-08-13 | End: 2018-09-06

## 2018-08-22 ENCOUNTER — PATIENT MESSAGE (OUTPATIENT)
Dept: OBSTETRICS AND GYNECOLOGY | Facility: CLINIC | Age: 38
End: 2018-08-22

## 2018-08-27 NOTE — PROGRESS NOTES
Subjective:       Patient ID: Monica Javier is a 38 y.o. female.    Chief Complaint: Anxiety (neeed not stated that she healt enough to work around kids  )    Patient is a 38 y.o.female primary hypercoagulable state who presents today for annual and anxiety.  Labs: due now  Vaccines: Influenza (yearly)  Gyn exam: up to date; Dr. Martin  Exercise: active  Diet: healthy  LMP: regular      Anxiety: some ocd tendencies as well; having agitation at times with  and children; no SI or HI  Review of Systems   Constitutional: Negative for appetite change, chills, diaphoresis, fatigue and fever.   HENT: Negative for congestion, dental problem, ear discharge, ear pain, hearing loss, postnasal drip, sinus pressure and sore throat.    Eyes: Negative for discharge, redness and itching.   Respiratory: Negative for cough, chest tightness, shortness of breath and wheezing.    Cardiovascular: Negative for chest pain, palpitations and leg swelling.   Gastrointestinal: Negative for abdominal pain, constipation, diarrhea, nausea and vomiting.   Endocrine: Negative for cold intolerance and heat intolerance.   Genitourinary: Negative for difficulty urinating, frequency, hematuria and urgency.   Musculoskeletal: Negative for arthralgias, back pain, gait problem, myalgias and neck pain.   Skin: Negative for color change and rash.   Neurological: Negative for dizziness, syncope and headaches.   Hematological: Negative for adenopathy.   Psychiatric/Behavioral: Negative for behavioral problems and sleep disturbance. The patient is not nervous/anxious.        Objective:      Physical Exam   Constitutional: She is oriented to person, place, and time. She appears well-developed and well-nourished. No distress.   HENT:   Head: Normocephalic and atraumatic.   Right Ear: External ear normal.   Left Ear: External ear normal.   Nose: Nose normal.   Mouth/Throat: Oropharynx is clear and moist. No oropharyngeal exudate.   Eyes: Conjunctivae  and EOM are normal. Pupils are equal, round, and reactive to light. Right eye exhibits no discharge. Left eye exhibits no discharge. No scleral icterus.   Neck: Normal range of motion. Neck supple. No JVD present. No thyromegaly present.   Cardiovascular: Normal rate, regular rhythm, normal heart sounds and intact distal pulses. Exam reveals no gallop and no friction rub.   No murmur heard.  Pulmonary/Chest: Effort normal and breath sounds normal. No stridor. No respiratory distress. She has no wheezes. She has no rales. She exhibits no tenderness.   Abdominal: Soft. Bowel sounds are normal. She exhibits no distension. There is no tenderness. There is no rebound.   Musculoskeletal: Normal range of motion. She exhibits no edema or tenderness.   Lymphadenopathy:     She has no cervical adenopathy.   Neurological: She is alert and oriented to person, place, and time. No cranial nerve deficit.   Skin: Skin is warm and dry. No rash noted. She is not diaphoretic. No erythema.   Psychiatric: She has a normal mood and affect. Her behavior is normal.   Nursing note and vitals reviewed.      Assessment and Plan:       1. Annual physical exam    - CBC auto differential; Future  - Comprehensive metabolic panel; Future  - Lipid panel; Future  - Vitamin D; Future  - Urinalysis; Future  - TSH; Future  - T4, free; Future  - Thyroid peroxidase antibody; Future    2. Multiple thyroid nodules  - sees endo  - CBC auto differential; Future  - Comprehensive metabolic panel; Future  - Lipid panel; Future  - Vitamin D; Future  - Urinalysis; Future  - TSH; Future    3. Hypothyroidism due to Hashimoto's thyroiditis    - CBC auto differential; Future  - Comprehensive metabolic panel; Future  - Lipid panel; Future  - Vitamin D; Future  - Urinalysis; Future  - TSH; Future    4. WOLF (generalized anxiety disorder)  - trial of zoloft and xanax prn; rtc in one month    5. Primary hypercoagulable state  - monitoring stable          No Follow-up on  file.

## 2018-09-06 ENCOUNTER — PATIENT MESSAGE (OUTPATIENT)
Dept: INTERNAL MEDICINE | Facility: CLINIC | Age: 38
End: 2018-09-06

## 2018-09-06 ENCOUNTER — OFFICE VISIT (OUTPATIENT)
Dept: INTERNAL MEDICINE | Facility: CLINIC | Age: 38
End: 2018-09-06
Payer: COMMERCIAL

## 2018-09-06 ENCOUNTER — LAB VISIT (OUTPATIENT)
Dept: LAB | Facility: HOSPITAL | Age: 38
End: 2018-09-06
Attending: INTERNAL MEDICINE
Payer: COMMERCIAL

## 2018-09-06 VITALS
SYSTOLIC BLOOD PRESSURE: 118 MMHG | DIASTOLIC BLOOD PRESSURE: 84 MMHG | HEART RATE: 68 BPM | HEIGHT: 67 IN | BODY MASS INDEX: 31.52 KG/M2 | WEIGHT: 200.81 LBS | TEMPERATURE: 98 F

## 2018-09-06 DIAGNOSIS — E04.2 MULTIPLE THYROID NODULES: ICD-10-CM

## 2018-09-06 DIAGNOSIS — E03.8 HYPOTHYROIDISM DUE TO HASHIMOTO'S THYROIDITIS: ICD-10-CM

## 2018-09-06 DIAGNOSIS — E06.3 HYPOTHYROIDISM DUE TO HASHIMOTO'S THYROIDITIS: ICD-10-CM

## 2018-09-06 DIAGNOSIS — Z00.00 ANNUAL PHYSICAL EXAM: Primary | ICD-10-CM

## 2018-09-06 DIAGNOSIS — Z00.00 ANNUAL PHYSICAL EXAM: ICD-10-CM

## 2018-09-06 DIAGNOSIS — F41.1 GAD (GENERALIZED ANXIETY DISORDER): ICD-10-CM

## 2018-09-06 DIAGNOSIS — D68.59 PRIMARY HYPERCOAGULABLE STATE: ICD-10-CM

## 2018-09-06 LAB
25(OH)D3+25(OH)D2 SERPL-MCNC: 30 NG/ML
ALBUMIN SERPL BCP-MCNC: 4.1 G/DL
ALP SERPL-CCNC: 68 U/L
ALT SERPL W/O P-5'-P-CCNC: 17 U/L
ANION GAP SERPL CALC-SCNC: 7 MMOL/L
AST SERPL-CCNC: 19 U/L
BASOPHILS # BLD AUTO: 0.08 K/UL
BASOPHILS NFR BLD: 1 %
BILIRUB SERPL-MCNC: 0.5 MG/DL
BUN SERPL-MCNC: 15 MG/DL
CALCIUM SERPL-MCNC: 10.2 MG/DL
CHLORIDE SERPL-SCNC: 104 MMOL/L
CHOLEST SERPL-MCNC: 172 MG/DL
CHOLEST/HDLC SERPL: 3.7 {RATIO}
CO2 SERPL-SCNC: 27 MMOL/L
CREAT SERPL-MCNC: 0.6 MG/DL
DIFFERENTIAL METHOD: ABNORMAL
EOSINOPHIL # BLD AUTO: 0.1 K/UL
EOSINOPHIL NFR BLD: 1.4 %
ERYTHROCYTE [DISTWIDTH] IN BLOOD BY AUTOMATED COUNT: 12 %
EST. GFR  (AFRICAN AMERICAN): >60 ML/MIN/1.73 M^2
EST. GFR  (NON AFRICAN AMERICAN): >60 ML/MIN/1.73 M^2
GLUCOSE SERPL-MCNC: 93 MG/DL
HCT VFR BLD AUTO: 37.7 %
HDLC SERPL-MCNC: 46 MG/DL
HDLC SERPL: 26.7 %
HGB BLD-MCNC: 12.7 G/DL
IMM GRANULOCYTES # BLD AUTO: 0.03 K/UL
IMM GRANULOCYTES NFR BLD AUTO: 0.4 %
LDLC SERPL CALC-MCNC: 85.8 MG/DL
LYMPHOCYTES # BLD AUTO: 1.9 K/UL
LYMPHOCYTES NFR BLD: 24.1 %
MCH RBC QN AUTO: 31.4 PG
MCHC RBC AUTO-ENTMCNC: 33.7 G/DL
MCV RBC AUTO: 93 FL
MONOCYTES # BLD AUTO: 0.6 K/UL
MONOCYTES NFR BLD: 7.5 %
NEUTROPHILS # BLD AUTO: 5.1 K/UL
NEUTROPHILS NFR BLD: 65.6 %
NONHDLC SERPL-MCNC: 126 MG/DL
NRBC BLD-RTO: 0 /100 WBC
PLATELET # BLD AUTO: 303 K/UL
PMV BLD AUTO: 10.1 FL
POTASSIUM SERPL-SCNC: 4.7 MMOL/L
PROT SERPL-MCNC: 7.4 G/DL
RBC # BLD AUTO: 4.04 M/UL
SODIUM SERPL-SCNC: 138 MMOL/L
T4 FREE SERPL-MCNC: 0.86 NG/DL
THYROPEROXIDASE IGG SERPL-ACNC: 244.1 IU/ML
TRIGL SERPL-MCNC: 201 MG/DL
TSH SERPL DL<=0.005 MIU/L-ACNC: 5.94 UIU/ML
WBC # BLD AUTO: 7.73 K/UL

## 2018-09-06 PROCEDURE — 80053 COMPREHEN METABOLIC PANEL: CPT

## 2018-09-06 PROCEDURE — 82306 VITAMIN D 25 HYDROXY: CPT

## 2018-09-06 PROCEDURE — 36415 COLL VENOUS BLD VENIPUNCTURE: CPT | Mod: PO

## 2018-09-06 PROCEDURE — 85025 COMPLETE CBC W/AUTO DIFF WBC: CPT

## 2018-09-06 PROCEDURE — 84439 ASSAY OF FREE THYROXINE: CPT

## 2018-09-06 PROCEDURE — 80061 LIPID PANEL: CPT

## 2018-09-06 PROCEDURE — 99395 PREV VISIT EST AGE 18-39: CPT | Mod: S$PBB,,, | Performed by: INTERNAL MEDICINE

## 2018-09-06 PROCEDURE — 84443 ASSAY THYROID STIM HORMONE: CPT

## 2018-09-06 PROCEDURE — 86376 MICROSOMAL ANTIBODY EACH: CPT

## 2018-09-06 PROCEDURE — 99999 PR PBB SHADOW E&M-EST. PATIENT-LVL III: CPT | Mod: PBBFAC,,, | Performed by: INTERNAL MEDICINE

## 2018-09-06 RX ORDER — ALPRAZOLAM 0.25 MG/1
0.25 TABLET ORAL 2 TIMES DAILY PRN
Qty: 30 TABLET | Refills: 0 | Status: SHIPPED | OUTPATIENT
Start: 2018-09-06 | End: 2018-11-21 | Stop reason: SDUPTHER

## 2018-09-06 RX ORDER — SERTRALINE HYDROCHLORIDE 25 MG/1
25 TABLET, FILM COATED ORAL DAILY
Qty: 30 TABLET | Refills: 11 | Status: SHIPPED | OUTPATIENT
Start: 2018-09-06 | End: 2019-09-11 | Stop reason: SDUPTHER

## 2018-09-21 ENCOUNTER — TELEPHONE (OUTPATIENT)
Dept: ENDOCRINOLOGY | Facility: CLINIC | Age: 38
End: 2018-09-21

## 2018-09-21 DIAGNOSIS — E03.9 HYPOTHYROIDISM, UNSPECIFIED TYPE: Primary | ICD-10-CM

## 2018-09-21 NOTE — TELEPHONE ENCOUNTER
----- Message from Hattie Camargo sent at 9/21/2018  2:49 PM CDT -----  Contact: Self  .Needs Advice    Reason for call: Had labs on 9/6 & her thyroid levels were high.  Asking if can go over to see if adjustment is needed.  States her hair has become brittle.        Communication Preference:  [phone]    Additional Information:

## 2018-09-24 ENCOUNTER — PATIENT MESSAGE (OUTPATIENT)
Dept: INTERNAL MEDICINE | Facility: CLINIC | Age: 38
End: 2018-09-24

## 2018-09-26 RX ORDER — LEVOTHYROXINE SODIUM 88 UG/1
88 TABLET ORAL DAILY
Qty: 30 TABLET | Refills: 11 | Status: SHIPPED | OUTPATIENT
Start: 2018-09-26 | End: 2020-01-11

## 2018-09-26 NOTE — TELEPHONE ENCOUNTER
Lab Results   Component Value Date    TSH 5.940 (H) 09/06/2018     Previous two TSH levels above reference range  Increase levothyroxine to 88 mcg daily   Repeat tsh in three months.

## 2018-11-21 RX ORDER — ALPRAZOLAM 0.25 MG/1
TABLET ORAL
Qty: 30 TABLET | Refills: 0 | Status: SHIPPED | OUTPATIENT
Start: 2018-11-21 | End: 2019-01-05 | Stop reason: SDUPTHER

## 2019-01-06 RX ORDER — ALPRAZOLAM 0.25 MG/1
TABLET ORAL
Qty: 30 TABLET | Refills: 0 | Status: SHIPPED | OUTPATIENT
Start: 2019-01-06 | End: 2019-03-10 | Stop reason: SDUPTHER

## 2019-03-10 RX ORDER — ALPRAZOLAM 0.25 MG/1
TABLET ORAL
Qty: 30 TABLET | Refills: 0 | Status: SHIPPED | OUTPATIENT
Start: 2019-03-10 | End: 2019-06-21 | Stop reason: SDUPTHER

## 2019-05-13 ENCOUNTER — TELEPHONE (OUTPATIENT)
Dept: OBSTETRICS AND GYNECOLOGY | Facility: CLINIC | Age: 39
End: 2019-05-13

## 2019-05-13 ENCOUNTER — OFFICE VISIT (OUTPATIENT)
Dept: OBSTETRICS AND GYNECOLOGY | Facility: CLINIC | Age: 39
End: 2019-05-13
Payer: COMMERCIAL

## 2019-05-13 VITALS
WEIGHT: 199.31 LBS | BODY MASS INDEX: 31.28 KG/M2 | DIASTOLIC BLOOD PRESSURE: 98 MMHG | SYSTOLIC BLOOD PRESSURE: 140 MMHG | HEIGHT: 67 IN

## 2019-05-13 DIAGNOSIS — B96.89 BV (BACTERIAL VAGINOSIS): ICD-10-CM

## 2019-05-13 DIAGNOSIS — N89.8 VAGINAL IRRITATION: Primary | ICD-10-CM

## 2019-05-13 DIAGNOSIS — R03.0 ELEVATED BLOOD PRESSURE READING IN OFFICE WITHOUT DIAGNOSIS OF HYPERTENSION: ICD-10-CM

## 2019-05-13 DIAGNOSIS — N76.0 BV (BACTERIAL VAGINOSIS): ICD-10-CM

## 2019-05-13 LAB
BACTERIA HYPHAE, POC: POSITIVE
BILIRUB SERPL-MCNC: NORMAL MG/DL
BLOOD URINE, POC: 250
COLOR, POC UA: NORMAL
GARDNERELLA VAGINALIS: NEGATIVE
GLUCOSE UR QL STRIP: NORMAL
KETONES UR QL STRIP: NORMAL
LEUKOCYTE ESTERASE URINE, POC: NORMAL
NITRITE, POC UA: NORMAL
OTHER MICROSC. OBSERVATIONS: ABNORMAL
PH, POC UA: 9
POC BACTERIAL VAGINOSIS: POSITIVE
POC CLUE CELLS: NEGATIVE
PROTEIN, POC: NORMAL
SPECIFIC GRAVITY, POC UA: 1
TRICHOMONAS, POC: NEGATIVE
UROBILINOGEN, POC UA: NORMAL
YEAST WET PREP: NEGATIVE
YEAST, POC: NEGATIVE

## 2019-05-13 PROCEDURE — 87186 SC STD MICRODIL/AGAR DIL: CPT

## 2019-05-13 PROCEDURE — 87077 CULTURE AEROBIC IDENTIFY: CPT

## 2019-05-13 PROCEDURE — 99214 PR OFFICE/OUTPT VISIT, EST, LEVL IV, 30-39 MIN: ICD-10-PCS | Mod: 25,S$GLB,, | Performed by: NURSE PRACTITIONER

## 2019-05-13 PROCEDURE — 99999 PR PBB SHADOW E&M-EST. PATIENT-LVL III: CPT | Mod: PBBFAC,,, | Performed by: NURSE PRACTITIONER

## 2019-05-13 PROCEDURE — 99999 PR PBB SHADOW E&M-EST. PATIENT-LVL III: ICD-10-PCS | Mod: PBBFAC,,, | Performed by: NURSE PRACTITIONER

## 2019-05-13 PROCEDURE — 3008F PR BODY MASS INDEX (BMI) DOCUMENTED: ICD-10-PCS | Mod: CPTII,S$GLB,, | Performed by: NURSE PRACTITIONER

## 2019-05-13 PROCEDURE — 87086 URINE CULTURE/COLONY COUNT: CPT

## 2019-05-13 PROCEDURE — 81002 URINALYSIS NONAUTO W/O SCOPE: CPT | Mod: S$GLB,,, | Performed by: NURSE PRACTITIONER

## 2019-05-13 PROCEDURE — 81002 POCT URINE DIPSTICK WITHOUT MICROSCOPE: ICD-10-PCS | Mod: S$GLB,,, | Performed by: NURSE PRACTITIONER

## 2019-05-13 PROCEDURE — 87210 POCT WET PREP: ICD-10-PCS | Mod: QW,S$GLB,, | Performed by: NURSE PRACTITIONER

## 2019-05-13 PROCEDURE — 87210 SMEAR WET MOUNT SALINE/INK: CPT | Mod: QW,S$GLB,, | Performed by: NURSE PRACTITIONER

## 2019-05-13 PROCEDURE — 3008F BODY MASS INDEX DOCD: CPT | Mod: CPTII,S$GLB,, | Performed by: NURSE PRACTITIONER

## 2019-05-13 PROCEDURE — 99214 OFFICE O/P EST MOD 30 MIN: CPT | Mod: 25,S$GLB,, | Performed by: NURSE PRACTITIONER

## 2019-05-13 PROCEDURE — 87088 URINE BACTERIA CULTURE: CPT

## 2019-05-13 RX ORDER — TRIAMCINOLONE ACETONIDE 1 MG/G
OINTMENT TOPICAL
Qty: 30 G | Refills: 0 | Status: SHIPPED | OUTPATIENT
Start: 2019-05-13 | End: 2021-05-03 | Stop reason: ALTCHOICE

## 2019-05-13 RX ORDER — TINIDAZOLE 500 MG/1
TABLET ORAL
Qty: 8 TABLET | Refills: 0 | Status: SHIPPED | OUTPATIENT
Start: 2019-05-13 | End: 2021-05-03 | Stop reason: ALTCHOICE

## 2019-05-13 RX ORDER — PHENTERMINE HYDROCHLORIDE 37.5 MG/1
TABLET ORAL
Refills: 0 | COMMUNITY
Start: 2019-04-25

## 2019-05-13 RX ORDER — NYSTATIN 100000 U/G
OINTMENT TOPICAL
Qty: 30 G | Refills: 0 | Status: SHIPPED | OUTPATIENT
Start: 2019-05-13 | End: 2021-05-03

## 2019-05-13 NOTE — PROGRESS NOTES
Chief Complaint:    Chief Complaint   Patient presents with    vaginal irritation        (Dr. Martin patient)    Last Pap:   2018  Normal,  HPV negative    HPI:     Monica Javier is a 38 y.o. female  presents with complaint of vaginal irritation since yesterday, when inserting a tampon (started her cycle 3 days ago); also reports some dysuria as well.  Denies vaginal odor or discharge prior to start of menstrual cycle.  No changes in soaps, or detergents.  No tampons used since last night but still feels vaginal irritation.  States typically, her cycles are extremely heavy, but this cycle was lighter than normal.    Alleviating factors: none.       ; No new sexual partners.      LMP:  Patient's last menstrual period was 05/10/2019.    Past Medical History:   Diagnosis Date    Asthma     Bicornuate uterus     History of MTHFR mutation     1 copy of MTHFR C677T mutation 1 copy MTHFR [id number] mutation    Hypothyroidism     Hypo    Thrombophilia     1 copy of MTHFR C677T mutation 1 copy MTHFR D0848H mutation       Past Surgical History:   Procedure Laterality Date     SECTION  , 2014    x 2    DILATION AND CURETTAGE OF UTERUS  2011    Partial Mole    DILATION AND CURETTAGE OF UTERUS  10/08/2012    Missed Ab (46 XX karatype)    TUBAL LIGATION      with last delivery        OB History    Para Term  AB Living   4 2 2   2 2   SAB TAB Ectopic Multiple Live Births   1       2      # Outcome Date GA Lbr Filemon/2nd Weight Sex Delivery Anes PTL Lv   4 Term 14 39w0d  3.685 kg (8 lb 2 oz) M CS-Unspec Spinal  NARENDRA   3 SAB 10/08/12 10w0d       FD      Birth Comments: Suction D&C (46 XX karyotype)    2 Molar 11              Birth Comments: Suction D&C   1 Term 09 40w0d  3.345 kg (7 lb 6 oz) F CS-Unspec Spinal  NARENDRA      Complications: Failure to Progress in Second Stage      Obstetric Comments   menarche 14       ROS:     GENERAL:  No fever, chills,  "fatigability or weight loss.  REPRODUCTIVE:  See HPI.  ABDOMEN:  No abdominal pain. Denies nausea. Denies vomiting. No diarrhea. No constipation.  URINARY:  No incontinence, no nocturia, no frequency; reports mild dysuria.  CARDIOVASCULAR:  No chest pain. No shortness of breath. No leg cramps.  NEUROLOGICAL:  No headaches. No vision changes.    Physical Exam:     Vitals:    05/13/19 1043 05/13/19 1123   BP: (!) 142/96 (!) 140/98   Weight: 90.4 kg (199 lb 4.7 oz)    Height: 5' 7" (1.702 m)    PainSc: 0-No pain      Body mass index is 31.21 kg/m².    GENERAL: No acute distress, alert and engaged  ABDOMEN:  No suprapubic tenderness.  VULVA: normal appearing vulva with no masses, tenderness or lesions.   VAGINA: normal appearing vagina with normal color and no lesions; + small amount bloody discharge c/w menses - KOH/WetPrep collected.   CERVIX: normal appearing cervix without discharge or lesions; no CMT.   UTERUS: uterus is normal size, shape, consistency and non-tender; mobile.   ADNEXA: normal adnexa in size, non-tender and no masses.    Results:      Urine dipstick shows:   positive for RBC's. (but pt on menstrual cycle)    BRICE/Wet Prep results:  BV:   POS,  Candida:  neg,  Trichomonas:   neg       (See full results under LABS in Epic)    Assessment/Plan:     Vaginal irritation  -     POCT URINE DIPSTICK WITHOUT MICROSCOPE  -     POCT KOH  -     POCT WET PREP  -     Urine culture  -     tinidazole (TINDAMAX) 500 MG tablet; Take 4 pills by mouth at once x 2 days.  Dispense: 8 tablet; Refill: 0  -     nystatin (MYCOSTATIN) ointment; Apply pea-sized amount to affected areas twice daily (mix with pea-sized amount of Triamcinolone ointment before applying).  Dispense: 30 g; Refill: 0  -     triamcinolone acetonide 0.1% (KENALOG) 0.1 % ointment; Apply pea-sized amount to affected areas twice daily (mix with pea-sized amount of Nystatin ointment before applying).  Dispense: 30 g; Refill: 0    BV (bacterial vaginosis)  -   "   tinidazole (TINDAMAX) 500 MG tablet; Take 4 pills by mouth at once x 2 days.  Dispense: 8 tablet; Refill: 0    Elevated blood pressure reading in office without diagnosis of hypertension        - advised patient to stop taking Adipex at this time, until she can get an appointment with her PCP.  Also advised patient to borrow her sister or father's blood pressure cuff to check it 1-2 times daily for the next week.  Advised patient to keep a log of blood pressures to bring with her when she has her appointment with her PCP.  Patient verbalized understanding.  She is to go to Urgent Care/ER if BP >/= 150/100, severe headache, visual disturbances    Counseling:     * Use of the [facility] Patient Portal discussed and encouraged during today's visit.     * Patient aware she will be notified of any results from today's visit once they have been reviewed by Provider, either via her [facility] patient portal, or telephone call.    Follow-up:  Follow up in about 1 month (around 6/13/2019) for Annual.    * Pt scheduled appt for annual WWE with  next month.  * Pt will schedule appt with PCP & Endocrinology as she is due to see both.

## 2019-05-13 NOTE — TELEPHONE ENCOUNTER
Pt reports vaginal irritation.  She isn't sure if she has a UTI or vaginal infection.  Denies s/s of UTI.  Scheduled today with Nadege.

## 2019-05-13 NOTE — TELEPHONE ENCOUNTER
Dr Martin pt calling, pt is on her cycle but has a vaginal irritation from a tampon and wants to be seen.Pt # [phone]

## 2019-05-14 ENCOUNTER — PATIENT MESSAGE (OUTPATIENT)
Dept: OBSTETRICS AND GYNECOLOGY | Facility: CLINIC | Age: 39
End: 2019-05-14

## 2019-05-15 DIAGNOSIS — N92.0 MENORRHAGIA WITH REGULAR CYCLE: ICD-10-CM

## 2019-05-15 RX ORDER — PROGESTERONE 100 MG/1
100 CAPSULE ORAL DAILY
Qty: 60 CAPSULE | Refills: 3 | Status: SHIPPED | OUTPATIENT
Start: 2019-05-15

## 2019-05-16 LAB — BACTERIA UR CULT: NORMAL

## 2019-05-17 ENCOUNTER — PATIENT MESSAGE (OUTPATIENT)
Dept: OBSTETRICS AND GYNECOLOGY | Facility: CLINIC | Age: 39
End: 2019-05-17

## 2019-05-17 ENCOUNTER — TELEPHONE (OUTPATIENT)
Dept: OBSTETRICS AND GYNECOLOGY | Facility: CLINIC | Age: 39
End: 2019-05-17

## 2019-05-17 DIAGNOSIS — N39.0 URINARY TRACT INFECTION WITHOUT HEMATURIA, SITE UNSPECIFIED: Primary | ICD-10-CM

## 2019-05-17 RX ORDER — NITROFURANTOIN 25; 75 MG/1; MG/1
100 CAPSULE ORAL 2 TIMES DAILY
Qty: 14 CAPSULE | Refills: 0 | Status: SHIPPED | OUTPATIENT
Start: 2019-05-17 | End: 2019-05-24

## 2019-05-17 NOTE — TELEPHONE ENCOUNTER
----- Message from Nadege Ortiz NP sent at 5/17/2019  8:41 AM CDT -----  Please make sure pt viewed and/or received my portal message below - - - -     Zan Anderson,              Your urine culture came back and it was (+) for a UTI.  I am sending in a prescription for you for Macrobid, and it will treat this infection.   It is important that you take the entire course of antibiotics, even if you start feeling better before you are done.   Sincerely,  HOA Omer

## 2019-05-17 NOTE — TELEPHONE ENCOUNTER
Pt seen results on the portal . Pt also asking if macrobid will help with the burning . I told pt to try azo over the counter  and make sure she finishes all the  meds that [facility] has prescribed .

## 2019-06-21 RX ORDER — ALPRAZOLAM 0.25 MG/1
0.25 TABLET ORAL 2 TIMES DAILY
Qty: 30 TABLET | Refills: 0 | Status: SHIPPED | OUTPATIENT
Start: 2019-06-21 | End: 2019-08-15 | Stop reason: SDUPTHER

## 2019-08-15 RX ORDER — ALPRAZOLAM 0.25 MG/1
0.25 TABLET ORAL 2 TIMES DAILY
Qty: 30 TABLET | Refills: 0 | Status: SHIPPED | OUTPATIENT
Start: 2019-08-15 | End: 2019-10-16 | Stop reason: SDUPTHER

## 2019-08-15 NOTE — TELEPHONE ENCOUNTER
Please let patient know that she is overdue for 6 month follow-up for controlled substance refill.

## 2019-09-11 RX ORDER — SERTRALINE HYDROCHLORIDE 25 MG/1
TABLET, FILM COATED ORAL
Qty: 30 TABLET | Refills: 11 | Status: SHIPPED | OUTPATIENT
Start: 2019-09-11 | End: 2019-10-15 | Stop reason: SDUPTHER

## 2019-10-15 RX ORDER — SERTRALINE HYDROCHLORIDE 25 MG/1
25 TABLET, FILM COATED ORAL DAILY
Qty: 30 TABLET | Refills: 11 | Status: SHIPPED | OUTPATIENT
Start: 2019-10-15 | End: 2020-03-24

## 2019-10-16 RX ORDER — ALPRAZOLAM 0.25 MG/1
0.25 TABLET ORAL 2 TIMES DAILY
Qty: 30 TABLET | Refills: 0 | Status: SHIPPED | OUTPATIENT
Start: 2019-10-16 | End: 2020-03-24 | Stop reason: SDUPTHER

## 2020-01-11 RX ORDER — LEVOTHYROXINE SODIUM 88 UG/1
88 TABLET ORAL DAILY
Qty: 30 TABLET | Refills: 0 | Status: SHIPPED | OUTPATIENT
Start: 2020-01-11 | End: 2021-05-07

## 2020-03-03 ENCOUNTER — PATIENT MESSAGE (OUTPATIENT)
Dept: INTERNAL MEDICINE | Facility: CLINIC | Age: 40
End: 2020-03-03

## 2020-03-03 RX ORDER — ALPRAZOLAM 0.25 MG/1
0.25 TABLET ORAL 2 TIMES DAILY
Qty: 30 TABLET | Refills: 0 | OUTPATIENT
Start: 2020-03-03

## 2020-03-23 ENCOUNTER — PATIENT MESSAGE (OUTPATIENT)
Dept: INTERNAL MEDICINE | Facility: CLINIC | Age: 40
End: 2020-03-23

## 2020-03-23 NOTE — PROGRESS NOTES
Subjective:       Patient ID: Monica Javier is a 39 y.o. female.    Chief Complaint: No chief complaint on file.    Patient is a 39 y.o.female who presents today for med fill  The patient location is: home in [place]  The chief complaint leading to consultation is: anxiety  Visit type: Virtual visit with synchronous audio and video  Total time spent with patient: 12 minutes  Each patient to whom he or she provides medical services by telemedicine is:  (1) informed of the relationship between the physician and patient and the respective role of any other health care provider with respect to management of the patient; and (2) notified that he or she may decline to receive medical services by telemedicine and may withdraw from such care at any time.    Pt notes extreme anxiety due to covid 19. Taking her zoloft 25 mg daily without much improvement in her symptoms. No SI or HI.    Review of Systems   Constitutional: Negative for appetite change, chills, diaphoresis and fever.   HENT: Negative for congestion, ear discharge, ear pain, postnasal drip, tinnitus, trouble swallowing and voice change.    Eyes: Negative for discharge, redness and itching.   Respiratory: Negative for cough, chest tightness, shortness of breath and wheezing.    Cardiovascular: Negative for chest pain, palpitations and leg swelling.   Gastrointestinal: Negative for abdominal pain, constipation, diarrhea, nausea and vomiting.   Endocrine: Negative for cold intolerance and heat intolerance.   Genitourinary: Negative for difficulty urinating, flank pain, hematuria and urgency.   Musculoskeletal: Negative for arthralgias, gait problem, myalgias and neck stiffness.   Skin: Negative for color change and rash.   Neurological: Negative for dizziness, seizures, syncope and headaches.   Hematological: Negative for adenopathy.   Psychiatric/Behavioral: Negative for agitation, behavioral problems, confusion and sleep disturbance. The patient is  nervous/anxious.        Objective:      Physical Exam   Constitutional: She is oriented to person, place, and time. She appears well-developed and well-nourished. No distress.   HENT:   Head: Normocephalic and atraumatic.   Right Ear: External ear normal.   Left Ear: External ear normal.   Eyes: Pupils are equal, round, and reactive to light. Conjunctivae are normal. Right eye exhibits no discharge. Left eye exhibits no discharge. No scleral icterus.   Neck: Normal range of motion. Neck supple.   Pulmonary/Chest: Effort normal. No stridor.   Neurological: She is alert and oriented to person, place, and time.   Skin: She is not diaphoretic.   Psychiatric: She has a normal mood and affect. Her behavior is normal. Judgment and thought content normal.       Assessment and Plan:       1. WOLF (generalized anxiety disorder)  - xanax refilled  · Medication Management: The risks and benefits of medication were discussed with the patient  - increase zoloft to 50 mg daily  - notify clinic of symptoms in 2-4 weeks          No follow-ups on file.

## 2020-03-24 ENCOUNTER — OFFICE VISIT (OUTPATIENT)
Dept: INTERNAL MEDICINE | Facility: CLINIC | Age: 40
End: 2020-03-24
Payer: COMMERCIAL

## 2020-03-24 DIAGNOSIS — F41.1 GAD (GENERALIZED ANXIETY DISORDER): Primary | ICD-10-CM

## 2020-03-24 PROCEDURE — 99214 OFFICE O/P EST MOD 30 MIN: CPT | Mod: 95,,, | Performed by: INTERNAL MEDICINE

## 2020-03-24 PROCEDURE — 99214 PR OFFICE/OUTPT VISIT, EST, LEVL IV, 30-39 MIN: ICD-10-PCS | Mod: 95,,, | Performed by: INTERNAL MEDICINE

## 2020-03-24 RX ORDER — SERTRALINE HYDROCHLORIDE 50 MG/1
50 TABLET, FILM COATED ORAL DAILY
Qty: 30 TABLET | Refills: 11 | Status: SHIPPED | OUTPATIENT
Start: 2020-03-24 | End: 2021-05-03 | Stop reason: SDUPTHER

## 2020-03-24 RX ORDER — ALPRAZOLAM 0.25 MG/1
0.25 TABLET ORAL 2 TIMES DAILY PRN
Qty: 60 TABLET | Refills: 0 | Status: SHIPPED | OUTPATIENT
Start: 2020-03-24 | End: 2021-05-03 | Stop reason: SDUPTHER

## 2020-04-06 RX ORDER — LEVOTHYROXINE SODIUM 88 UG/1
TABLET ORAL
Qty: 30 TABLET | Refills: 0 | OUTPATIENT
Start: 2020-04-06

## 2020-04-15 ENCOUNTER — PATIENT MESSAGE (OUTPATIENT)
Dept: INTERNAL MEDICINE | Facility: CLINIC | Age: 40
End: 2020-04-15

## 2020-05-26 ENCOUNTER — PATIENT MESSAGE (OUTPATIENT)
Dept: INTERNAL MEDICINE | Facility: CLINIC | Age: 40
End: 2020-05-26

## 2021-04-05 ENCOUNTER — PATIENT MESSAGE (OUTPATIENT)
Dept: ADMINISTRATIVE | Facility: HOSPITAL | Age: 41
End: 2021-04-05

## 2021-04-16 ENCOUNTER — PATIENT MESSAGE (OUTPATIENT)
Dept: RESEARCH | Facility: HOSPITAL | Age: 41
End: 2021-04-16

## 2021-05-03 ENCOUNTER — OFFICE VISIT (OUTPATIENT)
Dept: INTERNAL MEDICINE | Facility: CLINIC | Age: 41
End: 2021-05-03

## 2021-05-03 VITALS
WEIGHT: 220.88 LBS | BODY MASS INDEX: 34.67 KG/M2 | DIASTOLIC BLOOD PRESSURE: 90 MMHG | TEMPERATURE: 97 F | SYSTOLIC BLOOD PRESSURE: 130 MMHG | OXYGEN SATURATION: 97 % | HEART RATE: 91 BPM | HEIGHT: 67 IN

## 2021-05-03 DIAGNOSIS — R03.0 ELEVATED BP WITHOUT DIAGNOSIS OF HYPERTENSION: ICD-10-CM

## 2021-05-03 DIAGNOSIS — F32.A ANXIETY AND DEPRESSION: ICD-10-CM

## 2021-05-03 DIAGNOSIS — Z00.00 ANNUAL PHYSICAL EXAM: Primary | ICD-10-CM

## 2021-05-03 DIAGNOSIS — F41.9 ANXIETY AND DEPRESSION: ICD-10-CM

## 2021-05-03 DIAGNOSIS — Z63.4 DEATH OF PARENT: ICD-10-CM

## 2021-05-03 PROCEDURE — 99395 PREV VISIT EST AGE 18-39: CPT | Mod: S$PBB,,, | Performed by: HOSPITALIST

## 2021-05-03 PROCEDURE — 99215 OFFICE O/P EST HI 40 MIN: CPT | Mod: PBBFAC,PO | Performed by: HOSPITALIST

## 2021-05-03 PROCEDURE — 99999 PR PBB SHADOW E&M-EST. PATIENT-LVL V: CPT | Mod: PBBFAC,,, | Performed by: HOSPITALIST

## 2021-05-03 PROCEDURE — 99395 PR PREVENTIVE VISIT,EST,18-39: ICD-10-PCS | Mod: S$PBB,,, | Performed by: HOSPITALIST

## 2021-05-03 PROCEDURE — 99999 PR PBB SHADOW E&M-EST. PATIENT-LVL V: ICD-10-PCS | Mod: PBBFAC,,, | Performed by: HOSPITALIST

## 2021-05-03 RX ORDER — METHYLPREDNISOLONE 4 MG/1
TABLET ORAL
COMMUNITY
Start: 2021-03-18 | End: 2021-05-03

## 2021-05-03 RX ORDER — METHOCARBAMOL 750 MG/1
750 TABLET, FILM COATED ORAL NIGHTLY PRN
COMMUNITY
Start: 2021-03-18

## 2021-05-03 RX ORDER — ALPRAZOLAM 0.25 MG/1
0.25 TABLET ORAL 2 TIMES DAILY PRN
Qty: 60 TABLET | Refills: 2 | Status: SHIPPED | OUTPATIENT
Start: 2021-05-03 | End: 2022-01-05 | Stop reason: SDUPTHER

## 2021-05-03 RX ORDER — SERTRALINE HYDROCHLORIDE 50 MG/1
50 TABLET, FILM COATED ORAL DAILY
Qty: 30 TABLET | Refills: 11 | Status: SHIPPED | OUTPATIENT
Start: 2021-05-03 | End: 2023-06-21 | Stop reason: SDUPTHER

## 2021-05-03 SDOH — SOCIAL DETERMINANTS OF HEALTH (SDOH): DISSAPEARANCE AND DEATH OF FAMILY MEMBER: Z63.4

## 2021-05-06 ENCOUNTER — LAB VISIT (OUTPATIENT)
Dept: LAB | Facility: HOSPITAL | Age: 41
End: 2021-05-06
Attending: HOSPITALIST

## 2021-05-06 DIAGNOSIS — Z00.00 ANNUAL PHYSICAL EXAM: ICD-10-CM

## 2021-05-06 LAB
ALBUMIN SERPL BCP-MCNC: 3.9 G/DL (ref 3.5–5.2)
ALP SERPL-CCNC: 71 U/L (ref 55–135)
ALT SERPL W/O P-5'-P-CCNC: 26 U/L (ref 10–44)
ANION GAP SERPL CALC-SCNC: 8 MMOL/L (ref 8–16)
AST SERPL-CCNC: 21 U/L (ref 10–40)
BASOPHILS # BLD AUTO: 0.08 K/UL (ref 0–0.2)
BASOPHILS NFR BLD: 1.3 % (ref 0–1.9)
BILIRUB SERPL-MCNC: 0.3 MG/DL (ref 0.1–1)
BUN SERPL-MCNC: 12 MG/DL (ref 6–20)
CALCIUM SERPL-MCNC: 9.5 MG/DL (ref 8.7–10.5)
CHLORIDE SERPL-SCNC: 104 MMOL/L (ref 95–110)
CHOLEST SERPL-MCNC: 184 MG/DL (ref 120–199)
CHOLEST/HDLC SERPL: 4.5 {RATIO} (ref 2–5)
CO2 SERPL-SCNC: 28 MMOL/L (ref 23–29)
CREAT SERPL-MCNC: 0.7 MG/DL (ref 0.5–1.4)
DIFFERENTIAL METHOD: ABNORMAL
EOSINOPHIL # BLD AUTO: 0.1 K/UL (ref 0–0.5)
EOSINOPHIL NFR BLD: 1.3 % (ref 0–8)
ERYTHROCYTE [DISTWIDTH] IN BLOOD BY AUTOMATED COUNT: 12.2 % (ref 11.5–14.5)
EST. GFR  (AFRICAN AMERICAN): >60 ML/MIN/1.73 M^2
EST. GFR  (NON AFRICAN AMERICAN): >60 ML/MIN/1.73 M^2
GLUCOSE SERPL-MCNC: 96 MG/DL (ref 70–110)
HCT VFR BLD AUTO: 38.3 % (ref 37–48.5)
HDLC SERPL-MCNC: 41 MG/DL (ref 40–75)
HDLC SERPL: 22.3 % (ref 20–50)
HGB BLD-MCNC: 13.1 G/DL (ref 12–16)
IMM GRANULOCYTES # BLD AUTO: 0.04 K/UL (ref 0–0.04)
IMM GRANULOCYTES NFR BLD AUTO: 0.6 % (ref 0–0.5)
LDLC SERPL CALC-MCNC: 75.6 MG/DL (ref 63–159)
LYMPHOCYTES # BLD AUTO: 1.6 K/UL (ref 1–4.8)
LYMPHOCYTES NFR BLD: 24.9 % (ref 18–48)
MCH RBC QN AUTO: 31.9 PG (ref 27–31)
MCHC RBC AUTO-ENTMCNC: 34.2 G/DL (ref 32–36)
MCV RBC AUTO: 93 FL (ref 82–98)
MONOCYTES # BLD AUTO: 0.5 K/UL (ref 0.3–1)
MONOCYTES NFR BLD: 7.8 % (ref 4–15)
NEUTROPHILS # BLD AUTO: 4 K/UL (ref 1.8–7.7)
NEUTROPHILS NFR BLD: 64.1 % (ref 38–73)
NONHDLC SERPL-MCNC: 143 MG/DL
NRBC BLD-RTO: 0 /100 WBC
PLATELET # BLD AUTO: 295 K/UL (ref 150–450)
PMV BLD AUTO: 10.2 FL (ref 9.2–12.9)
POTASSIUM SERPL-SCNC: 4.2 MMOL/L (ref 3.5–5.1)
PROT SERPL-MCNC: 7.2 G/DL (ref 6–8.4)
RBC # BLD AUTO: 4.11 M/UL (ref 4–5.4)
SODIUM SERPL-SCNC: 140 MMOL/L (ref 136–145)
T4 FREE SERPL-MCNC: 0.68 NG/DL (ref 0.71–1.51)
TRIGL SERPL-MCNC: 337 MG/DL (ref 30–150)
TSH SERPL DL<=0.005 MIU/L-ACNC: 6.95 UIU/ML (ref 0.4–4)
WBC # BLD AUTO: 6.3 K/UL (ref 3.9–12.7)

## 2021-05-06 PROCEDURE — 85025 COMPLETE CBC W/AUTO DIFF WBC: CPT | Performed by: HOSPITALIST

## 2021-05-06 PROCEDURE — 80053 COMPREHEN METABOLIC PANEL: CPT | Performed by: HOSPITALIST

## 2021-05-06 PROCEDURE — 36415 COLL VENOUS BLD VENIPUNCTURE: CPT | Mod: PO | Performed by: HOSPITALIST

## 2021-05-06 PROCEDURE — 84443 ASSAY THYROID STIM HORMONE: CPT | Performed by: HOSPITALIST

## 2021-05-06 PROCEDURE — 80061 LIPID PANEL: CPT | Performed by: HOSPITALIST

## 2021-05-06 PROCEDURE — 84439 ASSAY OF FREE THYROXINE: CPT | Performed by: HOSPITALIST

## 2021-05-07 ENCOUNTER — PATIENT MESSAGE (OUTPATIENT)
Dept: INTERNAL MEDICINE | Facility: CLINIC | Age: 41
End: 2021-05-07

## 2021-05-07 ENCOUNTER — TELEPHONE (OUTPATIENT)
Dept: INTERNAL MEDICINE | Facility: CLINIC | Age: 41
End: 2021-05-07

## 2021-05-07 DIAGNOSIS — E03.8 HYPOTHYROIDISM DUE TO HASHIMOTO'S THYROIDITIS: Primary | ICD-10-CM

## 2021-05-07 DIAGNOSIS — E06.3 HYPOTHYROIDISM DUE TO HASHIMOTO'S THYROIDITIS: Primary | ICD-10-CM

## 2021-05-07 RX ORDER — LEVOTHYROXINE SODIUM 100 UG/1
88 TABLET ORAL DAILY
Qty: 90 TABLET | Refills: 0 | Status: SHIPPED | OUTPATIENT
Start: 2021-05-07 | End: 2021-08-03

## 2021-05-18 ENCOUNTER — TELEPHONE (OUTPATIENT)
Dept: INTERNAL MEDICINE | Facility: CLINIC | Age: 41
End: 2021-05-18

## 2021-06-23 DIAGNOSIS — Z12.31 OTHER SCREENING MAMMOGRAM: ICD-10-CM

## 2021-07-06 ENCOUNTER — PATIENT MESSAGE (OUTPATIENT)
Dept: ADMINISTRATIVE | Facility: HOSPITAL | Age: 41
End: 2021-07-06

## 2021-10-04 ENCOUNTER — PATIENT MESSAGE (OUTPATIENT)
Dept: ADMINISTRATIVE | Facility: HOSPITAL | Age: 41
End: 2021-10-04

## 2022-01-05 RX ORDER — SERTRALINE HYDROCHLORIDE 50 MG/1
50 TABLET, FILM COATED ORAL DAILY
Qty: 30 TABLET | Refills: 11 | Status: CANCELLED | OUTPATIENT
Start: 2022-01-05

## 2022-01-06 RX ORDER — LEVOTHYROXINE SODIUM 100 UG/1
100 TABLET ORAL DAILY
Qty: 90 TABLET | Refills: 0 | Status: SHIPPED | OUTPATIENT
Start: 2022-01-06 | End: 2022-07-20 | Stop reason: SDUPTHER

## 2022-01-06 RX ORDER — ALPRAZOLAM 0.25 MG/1
0.25 TABLET ORAL 2 TIMES DAILY PRN
Qty: 60 TABLET | Refills: 2 | Status: SHIPPED | OUTPATIENT
Start: 2022-01-06 | End: 2023-02-15 | Stop reason: SDUPTHER

## 2022-01-06 NOTE — TELEPHONE ENCOUNTER
No new care gaps identified.  Powered by [employer] by [employer]. Reference number: [id number].   1/05/2022 9:22:03 PM CST

## 2022-01-25 ENCOUNTER — PATIENT MESSAGE (OUTPATIENT)
Dept: ADMINISTRATIVE | Facility: HOSPITAL | Age: 42
End: 2022-01-25

## 2022-04-24 ENCOUNTER — HOSPITAL ENCOUNTER (EMERGENCY)
Facility: HOSPITAL | Age: 42
Discharge: HOME | End: 2022-04-24
Attending: EMERGENCY MEDICINE

## 2022-04-24 VITALS
RESPIRATION RATE: 18 BRPM | OXYGEN SATURATION: 95 % | BODY MASS INDEX: 33.49 KG/M2 | SYSTOLIC BLOOD PRESSURE: 142 MMHG | WEIGHT: 213.88 LBS | HEART RATE: 82 BPM | DIASTOLIC BLOOD PRESSURE: 98 MMHG | TEMPERATURE: 98 F

## 2022-04-24 DIAGNOSIS — N83.201 RIGHT OVARIAN CYST: Primary | ICD-10-CM

## 2022-04-24 LAB
ALBUMIN SERPL BCP-MCNC: 4 G/DL (ref 3.5–5.2)
ALP SERPL-CCNC: 87 U/L (ref 55–135)
ALT SERPL W/O P-5'-P-CCNC: 25 U/L (ref 10–44)
ANION GAP SERPL CALC-SCNC: 10 MMOL/L (ref 8–16)
AST SERPL-CCNC: 19 U/L (ref 10–40)
B-HCG UR QL: NEGATIVE
BASOPHILS # BLD AUTO: 0.08 K/UL (ref 0–0.2)
BASOPHILS NFR BLD: 1.2 % (ref 0–1.9)
BILIRUB SERPL-MCNC: 0.3 MG/DL (ref 0.1–1)
BILIRUB UR QL STRIP: NEGATIVE
BUN SERPL-MCNC: 17 MG/DL (ref 6–20)
CALCIUM SERPL-MCNC: 9 MG/DL (ref 8.7–10.5)
CHLORIDE SERPL-SCNC: 105 MMOL/L (ref 95–110)
CLARITY UR: CLEAR
CO2 SERPL-SCNC: 25 MMOL/L (ref 23–29)
COLOR UR: COLORLESS
CREAT SERPL-MCNC: 0.7 MG/DL (ref 0.5–1.4)
CTP QC/QA: YES
DIFFERENTIAL METHOD: ABNORMAL
EOSINOPHIL # BLD AUTO: 0.2 K/UL (ref 0–0.5)
EOSINOPHIL NFR BLD: 2.2 % (ref 0–8)
ERYTHROCYTE [DISTWIDTH] IN BLOOD BY AUTOMATED COUNT: 12.3 % (ref 11.5–14.5)
EST. GFR  (AFRICAN AMERICAN): >60 ML/MIN/1.73 M^2
EST. GFR  (NON AFRICAN AMERICAN): >60 ML/MIN/1.73 M^2
GLUCOSE SERPL-MCNC: 113 MG/DL (ref 70–110)
GLUCOSE UR QL STRIP: NEGATIVE
HCT VFR BLD AUTO: 35.7 % (ref 37–48.5)
HGB BLD-MCNC: 12.5 G/DL (ref 12–16)
HGB UR QL STRIP: ABNORMAL
HYALINE CASTS #/AREA URNS LPF: 1 /LPF
IMM GRANULOCYTES # BLD AUTO: 0.02 K/UL (ref 0–0.04)
IMM GRANULOCYTES NFR BLD AUTO: 0.3 % (ref 0–0.5)
KETONES UR QL STRIP: NEGATIVE
LEUKOCYTE ESTERASE UR QL STRIP: NEGATIVE
LYMPHOCYTES # BLD AUTO: 2.1 K/UL (ref 1–4.8)
LYMPHOCYTES NFR BLD: 31.1 % (ref 18–48)
MCH RBC QN AUTO: 32.2 PG (ref 27–31)
MCHC RBC AUTO-ENTMCNC: 35 G/DL (ref 32–36)
MCV RBC AUTO: 92 FL (ref 82–98)
MICROSCOPIC COMMENT: ABNORMAL
MONOCYTES # BLD AUTO: 0.7 K/UL (ref 0.3–1)
MONOCYTES NFR BLD: 10.3 % (ref 4–15)
NEUTROPHILS # BLD AUTO: 3.7 K/UL (ref 1.8–7.7)
NEUTROPHILS NFR BLD: 54.9 % (ref 38–73)
NITRITE UR QL STRIP: NEGATIVE
NRBC BLD-RTO: 0 /100 WBC
PH UR STRIP: 6 [PH] (ref 5–8)
PLATELET # BLD AUTO: 257 K/UL (ref 150–450)
PMV BLD AUTO: 9.4 FL (ref 9.2–12.9)
POTASSIUM SERPL-SCNC: 4.3 MMOL/L (ref 3.5–5.1)
PROT SERPL-MCNC: 7.3 G/DL (ref 6–8.4)
PROT UR QL STRIP: ABNORMAL
RBC # BLD AUTO: 3.88 M/UL (ref 4–5.4)
RBC #/AREA URNS HPF: 11 /HPF (ref 0–4)
SODIUM SERPL-SCNC: 140 MMOL/L (ref 136–145)
SP GR UR STRIP: 1.02 (ref 1–1.03)
SQUAMOUS #/AREA URNS HPF: 0 /HPF
URN SPEC COLLECT METH UR: ABNORMAL
UROBILINOGEN UR STRIP-ACNC: NEGATIVE EU/DL
WBC # BLD AUTO: 6.69 K/UL (ref 3.9–12.7)
WBC #/AREA URNS HPF: 1 /HPF (ref 0–5)

## 2022-04-24 PROCEDURE — 85025 COMPLETE CBC W/AUTO DIFF WBC: CPT | Performed by: EMERGENCY MEDICINE

## 2022-04-24 PROCEDURE — 80053 COMPREHEN METABOLIC PANEL: CPT | Performed by: EMERGENCY MEDICINE

## 2022-04-24 PROCEDURE — 81025 URINE PREGNANCY TEST: CPT | Performed by: EMERGENCY MEDICINE

## 2022-04-24 PROCEDURE — 81000 URINALYSIS NONAUTO W/SCOPE: CPT | Performed by: EMERGENCY MEDICINE

## 2022-04-24 PROCEDURE — 99285 EMERGENCY DEPT VISIT HI MDM: CPT | Mod: 25

## 2022-04-24 PROCEDURE — 96374 THER/PROPH/DIAG INJ IV PUSH: CPT

## 2022-04-24 PROCEDURE — 63600175 PHARM REV CODE 636 W HCPCS: Performed by: EMERGENCY MEDICINE

## 2022-04-24 RX ORDER — NAPROXEN 500 MG/1
500 TABLET ORAL 2 TIMES DAILY PRN
Qty: 14 TABLET | Refills: 0 | Status: SHIPPED | OUTPATIENT
Start: 2022-04-24

## 2022-04-24 RX ORDER — KETOROLAC TROMETHAMINE 30 MG/ML
15 INJECTION, SOLUTION INTRAMUSCULAR; INTRAVENOUS
Status: COMPLETED | OUTPATIENT
Start: 2022-04-24 | End: 2022-04-24

## 2022-04-24 RX ADMIN — KETOROLAC TROMETHAMINE 15 MG: 30 INJECTION, SOLUTION INTRAMUSCULAR; INTRAVENOUS at 06:04

## 2022-04-24 NOTE — ED PROVIDER NOTES
History       Chief Complaint   Patient presents with    Abdominal Pain     RLQ sharp pain that started around 4:30 am. Pain was 10/10 at that time. Patient states she had trouble getting into a comfortable position. Advil taken right after. Pain now 7/10. Patient states she is on her menstrual cycle. Has had ovarian cysts in past.        HPI    Monica Javier 41 y.o. who  has a past medical history of Asthma, Bicornuate uterus, History of MTHFR mutation, Hypothyroidism, and Thrombophilia. who presents to the emergency department today with a complaint of abdominal pain. The patient's pain is in RLQ.  It started this morning around 4:00 a.m..  Was in intense pain across the entire lower abdomen.  It then started to move to the right lower quadrant.  No flank pain.  No back pain.  Patient took some Aleve at home with no relief of the pain after an hour.  She had some nausea associated with the pain but no vomiting.  She could not get comfortable in any position.  Currently her pain is at a 5/10 on my exam she feels her medicine may be starting to take effect.  She is on her menstrual cycle.  She has had a past history of ovarian cyst.  She denies any fever, chills or sweats.        ROS    Constitutional: No fever, no chills.  Eyes: No discharge. No pain.  HENT: No nasal drainage. No ear ache. No sore throat.  Cardiovascular: No chest pain, no palpitations.  Respiratory: No cough, no shortness of breath.  Gastrointestinal:  No melena.  No anorexia.  Genitourinary: No dysuria, urgency.  Musculoskeletal: No back pain.   Skin: No rashes, no lesions.  Neurological: No headache, no focal weakness.    Otherwise remaining ROS negative     The history is provided by the patient      ALLERGIES REVIEWED  MEDICATIONS REVIEWED  PMH/PSH/SOC/FH REVIEWED      Past Medical History:   Diagnosis Date    Asthma     Bicornuate uterus     History of MTHFR mutation     1 copy of MTHFR C677T mutation 1 copy MTHFR [id number] mutation     Hypothyroidism     Hypo    Thrombophilia     1 copy of MTHFR C677T mutation 1 copy MTHFR [id number] mutation       Past Surgical History:   Procedure Laterality Date     SECTION  , 2014    x 2    DILATION AND CURETTAGE OF UTERUS  2011    Partial Mole    DILATION AND CURETTAGE OF UTERUS  10/08/2012    Missed Ab (47 XX karatype)    TUBAL LIGATION      with last delivery        Social History     Socioeconomic History    Marital status:    Tobacco Use    Smoking status: Never Smoker    Smokeless tobacco: Never Used   Substance and Sexual Activity    Alcohol use: Yes     Alcohol/week: 3.0 standard drinks     Types: 3 Glasses of wine per week     Comment: Social     Drug use: No    Sexual activity: Yes     Partners: Male     Birth control/protection: Surgical     Comment: :    BTL         Family History   Problem Relation Age of Onset    Diabetes Father     Hypertension Father     Hyperlipidemia Father     Diabetes Sister         juvenile    Kidney failure Sister     Cancer Paternal Grandfather         throat; smoker and alcoholic    Throat cancer Paternal Grandfather     Hypertension Mother     Breast cancer Neg Hx     Colon cancer Neg Hx     Ovarian cancer Neg Hx          Physical Exam    Nursing/Ancillary staff note reviewed.  VS reviewed  BP (!) 142/98   Pulse 82   Temp 97.8 °F (36.6 °C) (Oral)   Resp 18   Wt 97 kg (213 lb 13.5 oz)   LMP 2022 (Approximate)   SpO2 95%   BMI 33.49 kg/m²     General Appearance: The patient is alert, has no immediate need for airway protection and no signs of toxicity. No acute distress. Lying in bed but able to sit up without difficulty.   HEENT: Head: NCAT.       Eyes: Pupils equal and round no pallor or injection. Extra ocular movements intact. No drainage.       Neck: Neck is supple. No stridor.   Respiratory: There are no retractions,no increased work of breathing. No wheezing, no crackles. Chest wall nontender to  palpation.   Cardiovascular: Regular rate. Normal peripheral perfusion.   Gastrointestinal:  Abdomen is soft and has slight tenderness to palpation in the RLQ, no pain at McBurney's point, no periumbilical pain no CVA tenderness, no masses, bowel sounds normal. No pulsatile mass. No CVA tenderness.  Neurological: Alert and oriented x 4. CN II-XII grossly intact.  Skin: Warm and dry, no rashes.  Musculoskeletal:  Extremities are non-tender, non-swollen. Gait normal.     DIFFERENTIAL DIAGNOSIS: After history and physical exam a differential diagnosis was considered, but was not limited to, appendicitis, kidney stone, ovarian cyst, ovarian torsion, menstrual cramps, cholelithiasis, cholecystitis, pancreatitis, gastritis, gastroenteritis, ileus, small bowel obstruction, diverticulitis and urinary tract infection.          ED Course       Reviewed: CMP unremarkable, CBC unremarkable, UA with blood but on her menstrual cycle           Reviewed by myself, read by radiology   US Pelvis Comp with Transvag NON-OB (xpd)   Final Result   Abnormal      Since June 26, 2018, new mildly complex right ovarian cyst with associated surrounding fluid.  Possibly the cause of the patient's pain.  Recommend follow-up in 6-8 weeks to ensure resolution.      This report was flagged in Epic as abnormal..         Electronically signed by: Rashard Echeverria MD   Date:    04/24/2022   Time:    10:07      CT Abdomen Pelvis  Without Contrast   Final Result      1. Right adnexal cystic lesion measuring up to 3.7 cm compatible with simple ovarian cyst versus dominant follicle.   2. Normal appearance of the appendix.   3. Additional details, as provided in the body of report.         Electronically signed by: Nate Wayne   Date:    04/24/2022   Time:    07:04                   [facility]    Medical Decision Making:   Initial management:  Monica Brianaddie Raoulga 41 y.o. female presents to the ED today with RLQ abdominal pain. Concerns for appendicitis vs kidney  stone vs ovarian etiology. Will obtain labs, as she appears comfortable at this time CT Scan as initial imaging study to help further evaluate her pain, monitor and ressess. Pts pain is now at a 5/10 will give Toradol. The patient was seen and examined, see chart. The history and physical exam was obtained, see chart. The nursing notes and vital signs were reviewed, see chart.              Medications   ketorolac injection 15 mg (15 mg Intravenous Given 4/24/22 0646)         ED Course as of 04/24/22 1139   Sun Apr 24, 2022   0735 Pt has an adnexal cyst seen on her CT, given her severe pain earlier will obtain US.  Currently pts pain is resolved.  [JA]   1010 Pain is resolved. US shows ovarian cyst - no sign of torsion, will have her follow up with her GYN. [JA]      ED Course User Index  [JA] Garland Thorpe MD                History:   I obtained history from:  The patient  Old Medical Records: I decided to obtain old medical records.   Reviewed and summarized the old medical record and it showed patient was seen by her PCP in May 2021 for her annual exam.           I independently evaluated the following Clinical Tests:   I have ordered and independently interpreted Lab Tests:   CMP  unremarkable, CBC unremarkable.  UA with blood but expected pt is on her menstrual cycle           Patient improved with treatment in the emergency department and comfortable going home. Discussed reasons to return and importance of followup.  Patient understands that the emergency visit today is primarily to address immediate concerns and to rule out emergent cause of symptoms and that they may require further workup and evaluation as an outpatient. All questions addressed and patient given discharge instructions and followup information.           ED Management:  Monica Javier  presents to the emergency Department today with c/o RLQ abdominal pain.  Their workup today does not show any signs of acute abnormality which would  require hospitalization. Has ovarian cyst likely the source of her pain. She is pain free at this time. No need for admission at this time. The pt has remained afebrile here in the emergency department.  They are tolerating by mouth.  At this time I'll discharge home with symptom control and the pt will need follow up with GYN for continued management. Pt is comfortable with this plan.  After taking into careful account the historical factors and physical exam findings of the patient's presentation today, in conjunction with the empirical and objective data obtained on ED workup, no acute emergent medical condition has been identified. The patient appears to be low risk for an emergent medical condition and I feel it is safe and appropriate at this time for the patient to be discharged to follow-up as detailed in their discharge instructions for reevaluation and possible continued outpatient workup and management. I have discussed the specifics of the workup with the patient and the patient has verbalized understanding of the details of the workup, the diagnosis, the treatment plan, and the need for outpatient follow-up.  Although the patient has no emergent etiology today this does not preclude the development of an emergent condition so in addition, I have advised the patient that they can return to the ED and/or activate EMS at any time with worsening of their symptoms, change of their symptoms, or with any other medical complaint.  The patient remained comfortable and stable during their visit in the ED.  Discharge and follow-up instructions discussed with the patient who expressed understanding and willingness to comply with my recommendations.     This medical record was prepared using voice dictation software. There may be phonetic errors.        Impression          The encounter diagnosis was Right ovarian cyst.                New Prescriptions    NAPROXEN (NAPROSYN) 500 MG TABLET    Take 1 tablet (500 mg  total) by mouth 2 (two) times daily as needed (pain).          Follow-up Information     Schedule an appointment as soon as possible for a visit  with Your OB/GYN.                                Garland Thorpe MD  04/24/22 1499

## 2022-04-24 NOTE — DISCHARGE INSTRUCTIONS
Follow up with your OB/GYN for continued management of your ovarian cyst.   Take all your medications as prescribed. Return to the emergency department if you have increasing pain, chest pain, difficulty breathing,  nonstop vomiting, or any other concerns. Be sure to drink plenty of fluids to stay hydrated. Get plenty of rest. Please refer to additional educational material for further instructions.

## 2022-04-24 NOTE — ED NOTES
Pt's pain re-assessed; pt is currently pain free. Pt ambulatory to restroom and re-connected to monitor. VS stable. Pt updated by RN and Garland Thorpe MD about needed ultrasound and possible wait time. Pt agreeable, comfortable, and stable. Will continue to monitor.

## 2022-04-24 NOTE — ED NOTES
Pt changed into gown, resting in stretcher, states pain is decreased from earlier after taking advil but not resolved. Pain is sharp to right groin non radiating, she is non tender to all quadrants w/ palpation, no nausea or vomiting.

## 2022-09-14 DIAGNOSIS — Z12.31 OTHER SCREENING MAMMOGRAM: ICD-10-CM

## 2022-09-20 ENCOUNTER — PATIENT MESSAGE (OUTPATIENT)
Dept: ADMINISTRATIVE | Facility: HOSPITAL | Age: 42
End: 2022-09-20

## 2023-02-14 NOTE — PROGRESS NOTES
Subjective:       Patient ID: Monica Javier is a 42 y.o. female.    Chief Complaint: Annual Wellness Visit      Monica Javier is a 42 y.o. female who presents today for an annual wellness visit.     Review of patient's allergies indicates:  No Known Allergies   Medication List with Changes/Refills   Current Medications    LEVOTHYROXINE (SYNTHROID) 100 MCG TABLET    TAKE 1 TABLET BY MOUTH EVERY DAY    MV,CA,MIN/IRON/FA/GUARANA/CAFF (ONE-A-DAY WOMEN'S ACTIVE ORAL)    Take by mouth.    NAPROXEN (NAPROSYN) 500 MG TABLET    Take 1 tablet (500 mg total) by mouth 2 (two) times daily as needed (pain).    PHENTERMINE (ADIPEX-P) 37.5 MG TABLET    TAKE 1/2 A TABLET TWICE DAILY    PROGESTERONE (PROMETRIUM) 100 MG CAPSULE    TAKE 1 CAPSULE (100 MG TOTAL) BY MOUTH ONCE DAILY.    ROBAXIN-750 750 MG TAB    Take 750 mg by mouth nightly as needed.    SERTRALINE (ZOLOFT) 50 MG TABLET    Take 1 tablet (50 mg total) by mouth once daily.    VITAMIN D 1000 UNITS TAB    Take 1,000 Units by mouth once daily.   Changed and/or Refilled Medications    Modified Medication Previous Medication    ALPRAZOLAM (XANAX) 0.25 MG TABLET ALPRAZolam (XANAX) 0.25 MG tablet       Take 1 tablet (0.25 mg total) by mouth 2 (two) times daily as needed for Anxiety.    Take 1 tablet (0.25 mg total) by mouth 2 (two) times daily as needed for Anxiety.       Health Maintenance    PAP: 06/06/2018  Tetanus/Tdap: 03/28/2012      Patient ambulates on her own without an assistive device.     On average, how many days per week do you do moderate to strenuous exercise:  (like a brisk walk or jog; this does not include your job/work)  3-5     Do you eat fruits and vegetable every day?  Often, but not daily    Have you ever used tobacco products? No    Do you still have a menstrual cycle? Yes       If yes, please describe your cycles:  Painful and Heavy    Have you often been bothered by feeling down, depressed, or hopeless?  Not at all    How often do you drink  [FreeTextEntry1] : Mr. Hays is 83 year old male with a history of abnormal chest CT, acid reflux disease, allergic rhinitis, asthma, obstructive lung disease, CHILO and SOB who presents via video for a follow up visit. patient daughter participated in visit. \par \par His chief complaint is shortness of breath. \par \par 1. SOB\par -continue Prednisone 30 mg X 5 days, then check with office for tapering instruction.\par -start Albuterol via nebulizer Q6H.\par -continue O2 2L/min via nasal cannula PRN\par \par 2. COPD\par -continue Breo Ellipta 200 1 inhalation QD\par -continue Incruse Ellipta 1 puff QD.\par \par Patient to follow up with Dr. Delgado as scheduled.\par Patient to call with further questions and concerns.\par Patient verbalizes understanding of care plan and is agreeable.\par  "alcohol?  weekly    Review of Systems   Gastrointestinal:  Positive for abdominal distention. Negative for abdominal pain.   Genitourinary:  Positive for menstrual problem.   Psychiatric/Behavioral:  The patient is nervous/anxious.       Objective:     BP (!) 142/90 (BP Location: Right arm, Patient Position: Sitting, BP Method: Large (Manual))   Pulse 76   Temp 97.5 °F (36.4 °C) (Temporal)   Resp 16   Ht 5' 7" (1.702 m)   Wt 100 kg (220 lb 7.4 oz)   SpO2 98%   BMI 34.53 kg/m²     Physical Exam  Vitals reviewed.   Constitutional:       Appearance: Normal appearance.   HENT:      Head: Normocephalic and atraumatic.   Cardiovascular:      Rate and Rhythm: Normal rate and regular rhythm.      Heart sounds: Normal heart sounds. No murmur heard.  Pulmonary:      Effort: Pulmonary effort is normal.      Breath sounds: Normal breath sounds. No wheezing.   Skin:     General: Skin is warm and dry.   Neurological:      Mental Status: She is alert and oriented to person, place, and time.     BP Readings from Last 3 Encounters:   02/15/23 (!) 142/90   04/24/22 (!) 142/98   05/03/21 (!) 130/90     Wt Readings from Last 3 Encounters:   02/15/23 100 kg (220 lb 7.4 oz)   04/24/22 97 kg (213 lb 13.5 oz)   05/03/21 100.2 kg (220 lb 14.4 oz)       Last Labs:  Glucose   Date Value Ref Range Status   04/24/2022 113 (H) 70 - 110 mg/dL Final   05/06/2021 96 70 - 110 mg/dL Final     BUN   Date Value Ref Range Status   04/24/2022 17 6 - 20 mg/dL Final   05/06/2021 12 6 - 20 mg/dL Final     Creatinine   Date Value Ref Range Status   04/24/2022 0.7 0.5 - 1.4 mg/dL Final   05/06/2021 0.7 0.5 - 1.4 mg/dL Final     Cholesterol   Date Value Ref Range Status   05/06/2021 184 120 - 199 mg/dL Final     Comment:     The National Cholesterol Education Program (NCEP) has set the  following guidelines (reference ranges) for Cholesterol:  Optimal.....................<200 mg/dL  Borderline High.............200-239 " mg/dL  High........................> or = 240 mg/dL     09/06/2018 172 120 - 199 mg/dL Final     Comment:     The National Cholesterol Education Program (NCEP) has set the  following guidelines (reference ranges) for Cholesterol:  Optimal.....................<200 mg/dL  Borderline High.............200-239 mg/dL  High........................> or = 240 mg/dL       Hemoglobin A1C   Date Value Ref Range Status   06/20/2018 4.6 4.0 - 5.6 % Final     Comment:     ADA Screening Guidelines:  5.7-6.4%  Consistent with prediabetes  >or=6.5%  Consistent with diabetes  High levels of fetal hemoglobin interfere with the HbA1C  assay. Heterozygous hemoglobin variants (HbS, HgC, etc)do  not significantly interfere with this assay.   However, presence of multiple variants may affect accuracy.       Hemoglobin   Date Value Ref Range Status   04/24/2022 12.5 12.0 - 16.0 g/dL Final   05/06/2021 13.1 12.0 - 16.0 g/dL Final     Hematocrit   Date Value Ref Range Status   04/24/2022 35.7 (L) 37.0 - 48.5 % Final   05/06/2021 38.3 37.0 - 48.5 % Final     Vit D, 25-Hydroxy   Date Value Ref Range Status   09/06/2018 30 30 - 96 ng/mL Final     Comment:     Vitamin D deficiency.........<10 ng/mL                              Vitamin D insufficiency......10-29 ng/mL       Vitamin D sufficiency........> or equal to 30 ng/mL  Vitamin D toxicity............>100 ng/mL     06/20/2018 27 (L) 30 - 96 ng/mL Final     Comment:     Vitamin D deficiency.........<10 ng/mL                              Vitamin D insufficiency......10-29 ng/mL       Vitamin D sufficiency........> or equal to 30 ng/mL  Vitamin D toxicity............>100 ng/mL         I have reviewed the following:     Details / Date    [x]   Labs     []   Micro     []   Pathology     []   Imaging     []   Cardiology Procedures     [x]   Other  Patient's Medical and Surgical History        Assessment and Plan:     1. Abdominal bloating  - H. PYLORI ANTIBODY, IGG; Future    2. Anxiety  -  ALPRAZolam (XANAX) 0.25 MG tablet; Take 1 tablet (0.25 mg total) by mouth 2 (two) times daily as needed for Anxiety.  Dispense: 60 tablet; Refill: 0    3. Menorrhagia with regular cycle  4. Dysmenorrhea    - Ambulatory referral/consult to Obstetrics / Gynecology; Future    5. Hypothyroidism due to Hashimoto's thyroiditis  - CBC Auto Differential; Future  - Comprehensive Metabolic Panel; Future  - TSH; Future  - T4, FREE; Future    6. BMI 34.0-34.9,adult  - Hemoglobin A1C; Future  - Lipid Panel; Future

## 2023-02-15 ENCOUNTER — OFFICE VISIT (OUTPATIENT)
Dept: INTERNAL MEDICINE | Facility: CLINIC | Age: 43
End: 2023-02-15
Payer: COMMERCIAL

## 2023-02-15 ENCOUNTER — LAB VISIT (OUTPATIENT)
Dept: LAB | Facility: HOSPITAL | Age: 43
End: 2023-02-15
Payer: COMMERCIAL

## 2023-02-15 VITALS
SYSTOLIC BLOOD PRESSURE: 142 MMHG | DIASTOLIC BLOOD PRESSURE: 90 MMHG | HEIGHT: 67 IN | WEIGHT: 220.44 LBS | BODY MASS INDEX: 34.6 KG/M2 | HEART RATE: 76 BPM | OXYGEN SATURATION: 98 % | RESPIRATION RATE: 16 BRPM | TEMPERATURE: 98 F

## 2023-02-15 DIAGNOSIS — E03.8 HYPOTHYROIDISM DUE TO HASHIMOTO'S THYROIDITIS: ICD-10-CM

## 2023-02-15 DIAGNOSIS — E06.3 HYPOTHYROIDISM DUE TO HASHIMOTO'S THYROIDITIS: ICD-10-CM

## 2023-02-15 DIAGNOSIS — N94.6 DYSMENORRHEA: ICD-10-CM

## 2023-02-15 DIAGNOSIS — N92.0 MENORRHAGIA WITH REGULAR CYCLE: ICD-10-CM

## 2023-02-15 DIAGNOSIS — R14.0 ABDOMINAL BLOATING: Primary | ICD-10-CM

## 2023-02-15 DIAGNOSIS — F41.9 ANXIETY: ICD-10-CM

## 2023-02-15 DIAGNOSIS — R14.0 ABDOMINAL BLOATING: ICD-10-CM

## 2023-02-15 LAB
ALBUMIN SERPL BCP-MCNC: 4.1 G/DL (ref 3.5–5.2)
ALP SERPL-CCNC: 68 U/L (ref 55–135)
ALT SERPL W/O P-5'-P-CCNC: 28 U/L (ref 10–44)
ANION GAP SERPL CALC-SCNC: 10 MMOL/L (ref 8–16)
AST SERPL-CCNC: 18 U/L (ref 10–40)
BASOPHILS # BLD AUTO: 0.08 K/UL (ref 0–0.2)
BASOPHILS NFR BLD: 1.1 % (ref 0–1.9)
BILIRUB SERPL-MCNC: 0.3 MG/DL (ref 0.1–1)
BUN SERPL-MCNC: 15 MG/DL (ref 6–20)
CALCIUM SERPL-MCNC: 9.4 MG/DL (ref 8.7–10.5)
CHLORIDE SERPL-SCNC: 106 MMOL/L (ref 95–110)
CHOLEST SERPL-MCNC: 170 MG/DL (ref 120–199)
CHOLEST/HDLC SERPL: 4.4 {RATIO} (ref 2–5)
CO2 SERPL-SCNC: 22 MMOL/L (ref 23–29)
CREAT SERPL-MCNC: 0.7 MG/DL (ref 0.5–1.4)
DIFFERENTIAL METHOD: ABNORMAL
EOSINOPHIL # BLD AUTO: 0.1 K/UL (ref 0–0.5)
EOSINOPHIL NFR BLD: 1.9 % (ref 0–8)
ERYTHROCYTE [DISTWIDTH] IN BLOOD BY AUTOMATED COUNT: 11.8 % (ref 11.5–14.5)
EST. GFR  (NO RACE VARIABLE): >60 ML/MIN/1.73 M^2
GLUCOSE SERPL-MCNC: 87 MG/DL (ref 70–110)
HCT VFR BLD AUTO: 38.6 % (ref 37–48.5)
HDLC SERPL-MCNC: 39 MG/DL (ref 40–75)
HDLC SERPL: 22.9 % (ref 20–50)
HGB BLD-MCNC: 12.8 G/DL (ref 12–16)
IMM GRANULOCYTES # BLD AUTO: 0.03 K/UL (ref 0–0.04)
IMM GRANULOCYTES NFR BLD AUTO: 0.4 % (ref 0–0.5)
LDLC SERPL CALC-MCNC: 82 MG/DL (ref 63–159)
LYMPHOCYTES # BLD AUTO: 2.1 K/UL (ref 1–4.8)
LYMPHOCYTES NFR BLD: 27.5 % (ref 18–48)
MCH RBC QN AUTO: 31.3 PG (ref 27–31)
MCHC RBC AUTO-ENTMCNC: 33.2 G/DL (ref 32–36)
MCV RBC AUTO: 94 FL (ref 82–98)
MONOCYTES # BLD AUTO: 0.7 K/UL (ref 0.3–1)
MONOCYTES NFR BLD: 9.2 % (ref 4–15)
NEUTROPHILS # BLD AUTO: 4.5 K/UL (ref 1.8–7.7)
NEUTROPHILS NFR BLD: 59.9 % (ref 38–73)
NONHDLC SERPL-MCNC: 131 MG/DL
NRBC BLD-RTO: 0 /100 WBC
PLATELET # BLD AUTO: 329 K/UL (ref 150–450)
PMV BLD AUTO: 9.9 FL (ref 9.2–12.9)
POTASSIUM SERPL-SCNC: 4.2 MMOL/L (ref 3.5–5.1)
PROT SERPL-MCNC: 7.1 G/DL (ref 6–8.4)
RBC # BLD AUTO: 4.09 M/UL (ref 4–5.4)
SODIUM SERPL-SCNC: 138 MMOL/L (ref 136–145)
T4 FREE SERPL-MCNC: 0.69 NG/DL (ref 0.71–1.51)
TRIGL SERPL-MCNC: 245 MG/DL (ref 30–150)
TSH SERPL DL<=0.005 MIU/L-ACNC: 7.04 UIU/ML (ref 0.4–4)
WBC # BLD AUTO: 7.54 K/UL (ref 3.9–12.7)

## 2023-02-15 PROCEDURE — 99999 PR PBB SHADOW E&M-EST. PATIENT-LVL IV: CPT | Mod: PBBFAC,,, | Performed by: NURSE PRACTITIONER

## 2023-02-15 PROCEDURE — 84439 ASSAY OF FREE THYROXINE: CPT | Performed by: NURSE PRACTITIONER

## 2023-02-15 PROCEDURE — 99396 PR PREVENTIVE VISIT,EST,40-64: ICD-10-PCS | Mod: S$GLB,,, | Performed by: NURSE PRACTITIONER

## 2023-02-15 PROCEDURE — 85025 COMPLETE CBC W/AUTO DIFF WBC: CPT | Performed by: NURSE PRACTITIONER

## 2023-02-15 PROCEDURE — 86677 HELICOBACTER PYLORI ANTIBODY: CPT | Performed by: NURSE PRACTITIONER

## 2023-02-15 PROCEDURE — 1159F PR MEDICATION LIST DOCUMENTED IN MEDICAL RECORD: ICD-10-PCS | Mod: CPTII,S$GLB,, | Performed by: NURSE PRACTITIONER

## 2023-02-15 PROCEDURE — 83036 HEMOGLOBIN GLYCOSYLATED A1C: CPT | Performed by: NURSE PRACTITIONER

## 2023-02-15 PROCEDURE — 3008F PR BODY MASS INDEX (BMI) DOCUMENTED: ICD-10-PCS | Mod: CPTII,S$GLB,, | Performed by: NURSE PRACTITIONER

## 2023-02-15 PROCEDURE — 3077F SYST BP >= 140 MM HG: CPT | Mod: CPTII,S$GLB,, | Performed by: NURSE PRACTITIONER

## 2023-02-15 PROCEDURE — 3080F DIAST BP >= 90 MM HG: CPT | Mod: CPTII,S$GLB,, | Performed by: NURSE PRACTITIONER

## 2023-02-15 PROCEDURE — 80061 LIPID PANEL: CPT | Performed by: NURSE PRACTITIONER

## 2023-02-15 PROCEDURE — 80053 COMPREHEN METABOLIC PANEL: CPT | Performed by: NURSE PRACTITIONER

## 2023-02-15 PROCEDURE — 36415 COLL VENOUS BLD VENIPUNCTURE: CPT | Mod: PO | Performed by: NURSE PRACTITIONER

## 2023-02-15 PROCEDURE — 3080F PR MOST RECENT DIASTOLIC BLOOD PRESSURE >= 90 MM HG: ICD-10-PCS | Mod: CPTII,S$GLB,, | Performed by: NURSE PRACTITIONER

## 2023-02-15 PROCEDURE — 99999 PR PBB SHADOW E&M-EST. PATIENT-LVL IV: ICD-10-PCS | Mod: PBBFAC,,, | Performed by: NURSE PRACTITIONER

## 2023-02-15 PROCEDURE — 3008F BODY MASS INDEX DOCD: CPT | Mod: CPTII,S$GLB,, | Performed by: NURSE PRACTITIONER

## 2023-02-15 PROCEDURE — 3077F PR MOST RECENT SYSTOLIC BLOOD PRESSURE >= 140 MM HG: ICD-10-PCS | Mod: CPTII,S$GLB,, | Performed by: NURSE PRACTITIONER

## 2023-02-15 PROCEDURE — 84443 ASSAY THYROID STIM HORMONE: CPT | Performed by: NURSE PRACTITIONER

## 2023-02-15 PROCEDURE — 99396 PREV VISIT EST AGE 40-64: CPT | Mod: S$GLB,,, | Performed by: NURSE PRACTITIONER

## 2023-02-15 PROCEDURE — 1159F MED LIST DOCD IN RCRD: CPT | Mod: CPTII,S$GLB,, | Performed by: NURSE PRACTITIONER

## 2023-02-15 RX ORDER — ALPRAZOLAM 0.25 MG/1
0.25 TABLET ORAL 2 TIMES DAILY PRN
Qty: 60 TABLET | Refills: 0 | Status: CANCELLED | OUTPATIENT
Start: 2023-02-15

## 2023-02-15 RX ORDER — ALPRAZOLAM 0.25 MG/1
0.25 TABLET ORAL 2 TIMES DAILY PRN
Qty: 60 TABLET | Refills: 0 | Status: SHIPPED | OUTPATIENT
Start: 2023-02-15 | End: 2023-03-30 | Stop reason: SDUPTHER

## 2023-02-16 LAB
ESTIMATED AVG GLUCOSE: 85 MG/DL (ref 68–131)
H PYLORI IGG SERPL QL IA: NEGATIVE
HBA1C MFR BLD: 4.6 % (ref 4–5.6)

## 2023-02-17 ENCOUNTER — PATIENT MESSAGE (OUTPATIENT)
Dept: INTERNAL MEDICINE | Facility: CLINIC | Age: 43
End: 2023-02-17
Payer: COMMERCIAL

## 2023-02-17 DIAGNOSIS — E06.3 HYPOTHYROIDISM DUE TO HASHIMOTO'S THYROIDITIS: Primary | ICD-10-CM

## 2023-02-17 DIAGNOSIS — E03.8 HYPOTHYROIDISM DUE TO HASHIMOTO'S THYROIDITIS: Primary | ICD-10-CM

## 2023-02-17 RX ORDER — LEVOTHYROXINE SODIUM 125 UG/1
125 TABLET ORAL
Qty: 30 TABLET | Refills: 11 | Status: SHIPPED | OUTPATIENT
Start: 2023-02-17 | End: 2024-02-17

## 2023-02-17 RX ORDER — BUPROPION HYDROCHLORIDE 300 MG/1
300 TABLET ORAL DAILY
Qty: 30 TABLET | Refills: 11 | Status: SHIPPED | OUTPATIENT
Start: 2023-02-17 | End: 2024-02-17

## 2023-03-12 ENCOUNTER — PATIENT MESSAGE (OUTPATIENT)
Dept: INTERNAL MEDICINE | Facility: CLINIC | Age: 43
End: 2023-03-12
Payer: COMMERCIAL

## 2023-03-13 ENCOUNTER — OFFICE VISIT (OUTPATIENT)
Dept: INTERNAL MEDICINE | Facility: CLINIC | Age: 43
End: 2023-03-13
Payer: COMMERCIAL

## 2023-03-13 VITALS
WEIGHT: 223.13 LBS | SYSTOLIC BLOOD PRESSURE: 130 MMHG | BODY MASS INDEX: 35.02 KG/M2 | DIASTOLIC BLOOD PRESSURE: 70 MMHG | HEIGHT: 67 IN | RESPIRATION RATE: 15 BRPM | OXYGEN SATURATION: 98 % | TEMPERATURE: 97 F | HEART RATE: 75 BPM

## 2023-03-13 DIAGNOSIS — L50.9 URTICARIA: Primary | ICD-10-CM

## 2023-03-13 PROCEDURE — 3044F PR MOST RECENT HEMOGLOBIN A1C LEVEL <7.0%: ICD-10-PCS | Mod: CPTII,S$GLB,, | Performed by: HOSPITALIST

## 2023-03-13 PROCEDURE — 1160F PR REVIEW ALL MEDS BY PRESCRIBER/CLIN PHARMACIST DOCUMENTED: ICD-10-PCS | Mod: CPTII,S$GLB,, | Performed by: HOSPITALIST

## 2023-03-13 PROCEDURE — 3044F HG A1C LEVEL LT 7.0%: CPT | Mod: CPTII,S$GLB,, | Performed by: HOSPITALIST

## 2023-03-13 PROCEDURE — 1160F RVW MEDS BY RX/DR IN RCRD: CPT | Mod: CPTII,S$GLB,, | Performed by: HOSPITALIST

## 2023-03-13 PROCEDURE — 99999 PR PBB SHADOW E&M-EST. PATIENT-LVL IV: CPT | Mod: PBBFAC,,, | Performed by: HOSPITALIST

## 2023-03-13 PROCEDURE — 3078F PR MOST RECENT DIASTOLIC BLOOD PRESSURE < 80 MM HG: ICD-10-PCS | Mod: CPTII,S$GLB,, | Performed by: HOSPITALIST

## 2023-03-13 PROCEDURE — 96372 PR INJECTION,THERAP/PROPH/DIAG2ST, IM OR SUBCUT: ICD-10-PCS | Mod: S$GLB,,, | Performed by: HOSPITALIST

## 2023-03-13 PROCEDURE — 3008F BODY MASS INDEX DOCD: CPT | Mod: CPTII,S$GLB,, | Performed by: HOSPITALIST

## 2023-03-13 PROCEDURE — 3075F SYST BP GE 130 - 139MM HG: CPT | Mod: CPTII,S$GLB,, | Performed by: HOSPITALIST

## 2023-03-13 PROCEDURE — 99213 OFFICE O/P EST LOW 20 MIN: CPT | Mod: 25,S$GLB,, | Performed by: HOSPITALIST

## 2023-03-13 PROCEDURE — 3075F PR MOST RECENT SYSTOLIC BLOOD PRESS GE 130-139MM HG: ICD-10-PCS | Mod: CPTII,S$GLB,, | Performed by: HOSPITALIST

## 2023-03-13 PROCEDURE — 3008F PR BODY MASS INDEX (BMI) DOCUMENTED: ICD-10-PCS | Mod: CPTII,S$GLB,, | Performed by: HOSPITALIST

## 2023-03-13 PROCEDURE — 99213 PR OFFICE/OUTPT VISIT, EST, LEVL III, 20-29 MIN: ICD-10-PCS | Mod: 25,S$GLB,, | Performed by: HOSPITALIST

## 2023-03-13 PROCEDURE — 1159F MED LIST DOCD IN RCRD: CPT | Mod: CPTII,S$GLB,, | Performed by: HOSPITALIST

## 2023-03-13 PROCEDURE — 99999 PR PBB SHADOW E&M-EST. PATIENT-LVL IV: ICD-10-PCS | Mod: PBBFAC,,, | Performed by: HOSPITALIST

## 2023-03-13 PROCEDURE — 1159F PR MEDICATION LIST DOCUMENTED IN MEDICAL RECORD: ICD-10-PCS | Mod: CPTII,S$GLB,, | Performed by: HOSPITALIST

## 2023-03-13 PROCEDURE — 3078F DIAST BP <80 MM HG: CPT | Mod: CPTII,S$GLB,, | Performed by: HOSPITALIST

## 2023-03-13 PROCEDURE — 96372 THER/PROPH/DIAG INJ SC/IM: CPT | Mod: S$GLB,,, | Performed by: HOSPITALIST

## 2023-03-13 RX ORDER — TRIAMCINOLONE ACETONIDE 40 MG/ML
40 INJECTION, SUSPENSION INTRA-ARTICULAR; INTRAMUSCULAR
Status: COMPLETED | OUTPATIENT
Start: 2023-03-13 | End: 2023-03-13

## 2023-03-13 RX ADMIN — TRIAMCINOLONE ACETONIDE 40 MG: 40 INJECTION, SUSPENSION INTRA-ARTICULAR; INTRAMUSCULAR at 12:03

## 2023-03-13 NOTE — PROGRESS NOTES
Subjective:     @Patient ID: Monica Javier is a 42 y.o. female.    Chief Complaint: Urticaria    HPI  43 yo F presents for urgent care visit with c/o hives, rash x 3 days.   Endorses itchy hives of arms and abdomen and inner thighs that started 3 days ago. Has been taking benadryl and hydrocortisone cream  Denies change in diet.   No gardening.     Endorses change in detergent. Was using liquid tide now using tide pods  She started on new medication wellbutrin 1 month ago. Reports stopped medication 2 days ago    Reports significant itching. Would like steroid injection.     Reports 1 month ago had facial rash/neck. Reports got a shot at urgent care and it resolved     Review of Systems   Skin:  Positive for rash.        +itching   Past medical history, surgical history, and family medical history reviewed and updated as appropriate.    Medications and allergies reviewed.     Objective:     There were no vitals filed for this visit.  There is no height or weight on file to calculate BMI.  Physical Exam  Constitutional:       Appearance: Normal appearance.   HENT:      Head: Normocephalic and atraumatic.   Eyes:      General:         Right eye: No discharge.         Left eye: No discharge.      Conjunctiva/sclera: Conjunctivae normal.   Pulmonary:      Effort: Pulmonary effort is normal.   Musculoskeletal:         General: Normal range of motion.      Cervical back: Normal range of motion and neck supple.   Skin:     General: Skin is warm and dry.      Findings: Rash present.      Comments: + hives of upper back.     Reviewed home pics of rash. Today improvement of arms and abdomen   Neurological:      Mental Status: She is alert and oriented to person, place, and time.   Psychiatric:         Mood and Affect: Mood normal.         Behavior: Behavior normal.       Lab Results   Component Value Date    WBC 7.54 02/15/2023    HGB 12.8 02/15/2023    HCT 38.6 02/15/2023     02/15/2023    CHOL 170 02/15/2023     TRIG 245 (H) 02/15/2023    HDL 39 (L) 02/15/2023    ALT 28 02/15/2023    AST 18 02/15/2023     02/15/2023    K 4.2 02/15/2023     02/15/2023    CREATININE 0.7 02/15/2023    BUN 15 02/15/2023    CO2 22 (L) 02/15/2023    TSH 7.045 (H) 02/15/2023    HGBA1C 4.6 02/15/2023       Assessment:     1. Urticaria      Plan:   Monica was seen today for urticaria.    Diagnoses and all orders for this visit:    Urticaria  -     triamcinolone acetonide injection 40 mg    Unclear etiology  Will stay off wellbutrin for this week and monitor if symptoms improve  Also consider stopping use of tide pods   If no improvement recommend allergy specialist  Ok to continue benadryl. However, can consider loratidine or famotidine if benadryl causes drowsiness        Doris Diamond MD  Internal Medicine    3/13/2023

## 2023-03-30 DIAGNOSIS — F41.9 ANXIETY: ICD-10-CM

## 2023-03-30 RX ORDER — ALPRAZOLAM 0.25 MG/1
0.25 TABLET ORAL 2 TIMES DAILY PRN
Qty: 60 TABLET | Refills: 0 | Status: SHIPPED | OUTPATIENT
Start: 2023-03-30 | End: 2023-04-09 | Stop reason: SDUPTHER

## 2023-04-04 ENCOUNTER — PATIENT MESSAGE (OUTPATIENT)
Dept: INTERNAL MEDICINE | Facility: CLINIC | Age: 43
End: 2023-04-04
Payer: COMMERCIAL

## 2023-04-05 ENCOUNTER — PATIENT MESSAGE (OUTPATIENT)
Dept: INTERNAL MEDICINE | Facility: CLINIC | Age: 43
End: 2023-04-05
Payer: COMMERCIAL

## 2023-04-05 ENCOUNTER — TELEPHONE (OUTPATIENT)
Dept: INTERNAL MEDICINE | Facility: CLINIC | Age: 43
End: 2023-04-05
Payer: COMMERCIAL

## 2023-04-05 DIAGNOSIS — F41.9 ANXIETY: ICD-10-CM

## 2023-04-05 NOTE — TELEPHONE ENCOUNTER
Pt advised to keep upcoming appointment but advised to get evaluated in ER is symptoms worsen and BP gets progressively worst.

## 2023-04-05 NOTE — TELEPHONE ENCOUNTER
----- Message from Raghav Atkinson MA sent at 4/5/2023  3:13 PM CDT -----  Pharmacy calling to inform the provider that the prescription ALPRAZolam (XANAX) 0.25 MG tablet is on back order. They do have 0.5 in stock.      [facility]/pharmacy #[id number] - [place] - [address] AT [facility]  [address] [address]  Phone: [phone] Fax: [phone]

## 2023-04-05 NOTE — TELEPHONE ENCOUNTER
Pt state at her gynecology visit her /100.  Pt state since last seen in clinic (February 15th), she has been exercising more and watching salt intake also has lost 7 lbs.

## 2023-04-09 RX ORDER — ALPRAZOLAM 0.5 MG/1
0.25 TABLET ORAL 2 TIMES DAILY PRN
Qty: 45 TABLET | Refills: 0 | Status: SHIPPED | OUTPATIENT
Start: 2023-04-09 | End: 2023-05-18

## 2023-04-11 NOTE — PROGRESS NOTES
Subjective:       Patient ID: Monica Javier is a 42 y.o. female.    Chief Complaint: Hypertension      Patient is a 42 y.o. female who traditionally follows with Alanna John DO presenting today for:    Hypertension  Patient had elevated BP at prior visits.   Was monitoring at home:  147/96  136/99  139/94  137/92  132/96  139/93  165/100 (at GYN office)  144/105    She notes since seeing her GYN and having an elevated pressure she has been anxious regarding her BP. Notes her father was diagnosed with HTN in his 40s and recently passed. No longer on Zoloft and has been out of Xanax.     Patient home cuff: 173/113  Manual cuff: 162/110    Review of patient's allergies indicates:   Allergen Reactions    Wellbutrin [bupropion hcl] Hives       Medication List with Changes/Refills   New Medications    METOPROLOL SUCCINATE (TOPROL-XL) 25 MG 24 HR TABLET    Take 1 tablet (25 mg total) by mouth once daily.   Current Medications    ALPRAZOLAM (XANAX) 0.5 MG TABLET    Take 0.5 tablets (0.25 mg total) by mouth 2 (two) times daily as needed for Anxiety.    BUPROPION (WELLBUTRIN XL) 300 MG 24 HR TABLET    Take 1 tablet (300 mg total) by mouth once daily.    LEVOTHYROXINE (SYNTHROID) 125 MCG TABLET    Take 1 tablet (125 mcg total) by mouth before breakfast.    MV,CA,MIN/IRON/FA/GUARANA/CAFF (ONE-A-DAY WOMEN'S ACTIVE ORAL)    Take by mouth.    NAPROXEN (NAPROSYN) 500 MG TABLET    Take 1 tablet (500 mg total) by mouth 2 (two) times daily as needed (pain).    PHENTERMINE (ADIPEX-P) 37.5 MG TABLET    TAKE 1/2 A TABLET TWICE DAILY    PROGESTERONE (PROMETRIUM) 100 MG CAPSULE    TAKE 1 CAPSULE (100 MG TOTAL) BY MOUTH ONCE DAILY.    ROBAXIN-750 750 MG TAB    Take 750 mg by mouth nightly as needed.    SERTRALINE (ZOLOFT) 50 MG TABLET    Take 1 tablet (50 mg total) by mouth once daily.    VITAMIN D 1000 UNITS TAB    Take 1,000 Units by mouth once daily.     Medical, social and surgical history has been reviewed with the patient.  "    Review of Systems   Eyes:  Negative for blurred vision.   Respiratory:  Negative for shortness of breath.    Cardiovascular:  Negative for chest pain.   Neurological:  Positive for headaches.   Psychiatric/Behavioral:  The patient is nervous/anxious.     Review of Systems   Eyes:  Negative for blurred vision.   Respiratory:  Negative for shortness of breath.    Cardiovascular:  Negative for chest pain.   Neurological:  Positive for headaches.   Psychiatric/Behavioral:  The patient is nervous/anxious.       Objective:   BP (!) 162/110 (BP Location: Right arm, Patient Position: Sitting, BP Method: Medium (Manual))   Pulse 81   Temp 97.2 °F (36.2 °C) (Temporal)   Resp 18   Ht 5' 7" (1.702 m)   Wt 100 kg (220 lb 7.4 oz)   LMP 04/01/2023 (Exact Date)   SpO2 98%   BMI 34.53 kg/m²       Physical Exam  Vitals reviewed.   Constitutional:       Appearance: Normal appearance.   HENT:      Head: Normocephalic and atraumatic.   Cardiovascular:      Rate and Rhythm: Normal rate and regular rhythm.      Heart sounds: Normal heart sounds. No murmur heard.  Pulmonary:      Effort: Pulmonary effort is normal.      Breath sounds: Normal breath sounds. No wheezing.   Skin:     General: Skin is warm and dry.   Neurological:      Mental Status: She is alert and oriented to person, place, and time.       Last Labs:  Glucose   Date Value Ref Range Status   02/15/2023 87 70 - 110 mg/dL Final   04/24/2022 113 (H) 70 - 110 mg/dL Final     BUN   Date Value Ref Range Status   02/15/2023 15 6 - 20 mg/dL Final   04/24/2022 17 6 - 20 mg/dL Final     Creatinine   Date Value Ref Range Status   02/15/2023 0.7 0.5 - 1.4 mg/dL Final   04/24/2022 0.7 0.5 - 1.4 mg/dL Final     Cholesterol   Date Value Ref Range Status   02/15/2023 170 120 - 199 mg/dL Final     Comment:     The National Cholesterol Education Program (NCEP) has set the  following guidelines (reference ranges) for Cholesterol:  Optimal.....................<200 mg/dL  Borderline " High.............200-239 mg/dL  High........................> or = 240 mg/dL     05/06/2021 184 120 - 199 mg/dL Final     Comment:     The National Cholesterol Education Program (NCEP) has set the  following guidelines (reference ranges) for Cholesterol:  Optimal.....................<200 mg/dL  Borderline High.............200-239 mg/dL  High........................> or = 240 mg/dL       Hemoglobin A1C   Date Value Ref Range Status   02/15/2023 4.6 4.0 - 5.6 % Final     Comment:     ADA Screening Guidelines:  5.7-6.4%  Consistent with prediabetes  >or=6.5%  Consistent with diabetes    High levels of fetal hemoglobin interfere with the HbA1C  assay. Heterozygous hemoglobin variants (HbS, HgC, etc)do  not significantly interfere with this assay.   However, presence of multiple variants may affect accuracy.     06/20/2018 4.6 4.0 - 5.6 % Final     Comment:     ADA Screening Guidelines:  5.7-6.4%  Consistent with prediabetes  >or=6.5%  Consistent with diabetes  High levels of fetal hemoglobin interfere with the HbA1C  assay. Heterozygous hemoglobin variants (HbS, HgC, etc)do  not significantly interfere with this assay.   However, presence of multiple variants may affect accuracy.       Hemoglobin   Date Value Ref Range Status   02/15/2023 12.8 12.0 - 16.0 g/dL Final   04/24/2022 12.5 12.0 - 16.0 g/dL Final     Hematocrit   Date Value Ref Range Status   02/15/2023 38.6 37.0 - 48.5 % Final   04/24/2022 35.7 (L) 37.0 - 48.5 % Final       I have reviewed the following:     Details / Date    [x]   Labs     []   Micro     []   Pathology     []   Imaging     []   Cardiology Procedures     []   Other        Assessment and Plan:     1. Hypertension, unspecified type  2. Anxiety    - metoprolol succinate (TOPROL-XL) 25 MG 24 hr tablet; Take 1 tablet (25 mg total) by mouth once daily.  Dispense: 30 tablet; Refill: 11    Starting with Toprol-XL for dual benefit (anxiety/HTN). Educated on bradycardia (heart rate <60). Will continue  to limit salt and monitor BP at home.

## 2023-04-12 ENCOUNTER — OFFICE VISIT (OUTPATIENT)
Dept: INTERNAL MEDICINE | Facility: CLINIC | Age: 43
End: 2023-04-12
Payer: COMMERCIAL

## 2023-04-12 ENCOUNTER — HOSPITAL ENCOUNTER (OUTPATIENT)
Dept: RADIOLOGY | Facility: HOSPITAL | Age: 43
Discharge: HOME | End: 2023-04-12
Attending: OBSTETRICS & GYNECOLOGY
Payer: COMMERCIAL

## 2023-04-12 ENCOUNTER — LAB VISIT (OUTPATIENT)
Dept: LAB | Facility: HOSPITAL | Age: 43
End: 2023-04-12
Payer: COMMERCIAL

## 2023-04-12 VITALS
SYSTOLIC BLOOD PRESSURE: 162 MMHG | TEMPERATURE: 97 F | BODY MASS INDEX: 34.6 KG/M2 | HEART RATE: 81 BPM | HEIGHT: 67 IN | RESPIRATION RATE: 18 BRPM | OXYGEN SATURATION: 98 % | WEIGHT: 220.44 LBS | DIASTOLIC BLOOD PRESSURE: 110 MMHG

## 2023-04-12 DIAGNOSIS — F41.9 ANXIETY: ICD-10-CM

## 2023-04-12 DIAGNOSIS — I10 HYPERTENSION, UNSPECIFIED TYPE: Primary | ICD-10-CM

## 2023-04-12 DIAGNOSIS — E06.3 HYPOTHYROIDISM DUE TO HASHIMOTO'S THYROIDITIS: ICD-10-CM

## 2023-04-12 DIAGNOSIS — E03.8 HYPOTHYROIDISM DUE TO HASHIMOTO'S THYROIDITIS: ICD-10-CM

## 2023-04-12 DIAGNOSIS — N93.9 HEMORRHAGE IN UTERUS: ICD-10-CM

## 2023-04-12 LAB
T4 FREE SERPL-MCNC: 0.95 NG/DL (ref 0.71–1.51)
TSH SERPL DL<=0.005 MIU/L-ACNC: 2.09 UIU/ML (ref 0.4–4)

## 2023-04-12 PROCEDURE — 3080F PR MOST RECENT DIASTOLIC BLOOD PRESSURE >= 90 MM HG: ICD-10-PCS | Mod: CPTII,S$GLB,, | Performed by: NURSE PRACTITIONER

## 2023-04-12 PROCEDURE — 1160F RVW MEDS BY RX/DR IN RCRD: CPT | Mod: CPTII,S$GLB,, | Performed by: NURSE PRACTITIONER

## 2023-04-12 PROCEDURE — 3044F HG A1C LEVEL LT 7.0%: CPT | Mod: CPTII,S$GLB,, | Performed by: NURSE PRACTITIONER

## 2023-04-12 PROCEDURE — 84439 ASSAY OF FREE THYROXINE: CPT | Performed by: NURSE PRACTITIONER

## 2023-04-12 PROCEDURE — 3077F PR MOST RECENT SYSTOLIC BLOOD PRESSURE >= 140 MM HG: ICD-10-PCS | Mod: CPTII,S$GLB,, | Performed by: NURSE PRACTITIONER

## 2023-04-12 PROCEDURE — 76830 TRANSVAGINAL US NON-OB: CPT | Mod: 26,,, | Performed by: INTERNAL MEDICINE

## 2023-04-12 PROCEDURE — 1159F PR MEDICATION LIST DOCUMENTED IN MEDICAL RECORD: ICD-10-PCS | Mod: CPTII,S$GLB,, | Performed by: NURSE PRACTITIONER

## 2023-04-12 PROCEDURE — 76830 US PELVIS COMP WITH TRANSVAG NON-OB (XPD): ICD-10-PCS | Mod: 26,,, | Performed by: INTERNAL MEDICINE

## 2023-04-12 PROCEDURE — 76856 US EXAM PELVIC COMPLETE: CPT | Mod: 26,,, | Performed by: INTERNAL MEDICINE

## 2023-04-12 PROCEDURE — 3044F PR MOST RECENT HEMOGLOBIN A1C LEVEL <7.0%: ICD-10-PCS | Mod: CPTII,S$GLB,, | Performed by: NURSE PRACTITIONER

## 2023-04-12 PROCEDURE — 3008F BODY MASS INDEX DOCD: CPT | Mod: CPTII,S$GLB,, | Performed by: NURSE PRACTITIONER

## 2023-04-12 PROCEDURE — 36415 COLL VENOUS BLD VENIPUNCTURE: CPT | Mod: PO | Performed by: NURSE PRACTITIONER

## 2023-04-12 PROCEDURE — 84443 ASSAY THYROID STIM HORMONE: CPT | Performed by: NURSE PRACTITIONER

## 2023-04-12 PROCEDURE — 3077F SYST BP >= 140 MM HG: CPT | Mod: CPTII,S$GLB,, | Performed by: NURSE PRACTITIONER

## 2023-04-12 PROCEDURE — 99999 PR PBB SHADOW E&M-EST. PATIENT-LVL IV: ICD-10-PCS | Mod: PBBFAC,,, | Performed by: NURSE PRACTITIONER

## 2023-04-12 PROCEDURE — 99214 OFFICE O/P EST MOD 30 MIN: CPT | Mod: S$GLB,,, | Performed by: NURSE PRACTITIONER

## 2023-04-12 PROCEDURE — 76856 US PELVIS COMP WITH TRANSVAG NON-OB (XPD): ICD-10-PCS | Mod: 26,,, | Performed by: INTERNAL MEDICINE

## 2023-04-12 PROCEDURE — 76856 US EXAM PELVIC COMPLETE: CPT | Mod: TC

## 2023-04-12 PROCEDURE — 99214 PR OFFICE/OUTPT VISIT, EST, LEVL IV, 30-39 MIN: ICD-10-PCS | Mod: S$GLB,,, | Performed by: NURSE PRACTITIONER

## 2023-04-12 PROCEDURE — 99999 PR PBB SHADOW E&M-EST. PATIENT-LVL IV: CPT | Mod: PBBFAC,,, | Performed by: NURSE PRACTITIONER

## 2023-04-12 PROCEDURE — 3080F DIAST BP >= 90 MM HG: CPT | Mod: CPTII,S$GLB,, | Performed by: NURSE PRACTITIONER

## 2023-04-12 PROCEDURE — 1160F PR REVIEW ALL MEDS BY PRESCRIBER/CLIN PHARMACIST DOCUMENTED: ICD-10-PCS | Mod: CPTII,S$GLB,, | Performed by: NURSE PRACTITIONER

## 2023-04-12 PROCEDURE — 3008F PR BODY MASS INDEX (BMI) DOCUMENTED: ICD-10-PCS | Mod: CPTII,S$GLB,, | Performed by: NURSE PRACTITIONER

## 2023-04-12 PROCEDURE — 1159F MED LIST DOCD IN RCRD: CPT | Mod: CPTII,S$GLB,, | Performed by: NURSE PRACTITIONER

## 2023-04-12 RX ORDER — METOPROLOL SUCCINATE 25 MG/1
25 TABLET, EXTENDED RELEASE ORAL DAILY
Qty: 30 TABLET | Refills: 11 | Status: SHIPPED | OUTPATIENT
Start: 2023-04-12 | End: 2023-06-05 | Stop reason: SDUPTHER

## 2023-04-25 ENCOUNTER — PATIENT MESSAGE (OUTPATIENT)
Dept: INTERNAL MEDICINE | Facility: CLINIC | Age: 43
End: 2023-04-25
Payer: COMMERCIAL

## 2023-04-26 ENCOUNTER — PATIENT MESSAGE (OUTPATIENT)
Dept: INTERNAL MEDICINE | Facility: CLINIC | Age: 43
End: 2023-04-26
Payer: COMMERCIAL

## 2023-05-13 DIAGNOSIS — F41.9 ANXIETY: ICD-10-CM

## 2023-05-18 RX ORDER — ALPRAZOLAM 0.5 MG/1
TABLET ORAL
Qty: 30 TABLET | Refills: 1 | Status: SHIPPED | OUTPATIENT
Start: 2023-05-18

## 2023-06-05 DIAGNOSIS — I10 HYPERTENSION, UNSPECIFIED TYPE: ICD-10-CM

## 2023-06-05 DIAGNOSIS — F41.9 ANXIETY: ICD-10-CM

## 2023-06-05 RX ORDER — METOPROLOL SUCCINATE 25 MG/1
25 TABLET, EXTENDED RELEASE ORAL DAILY
Qty: 30 TABLET | Refills: 3 | Status: SHIPPED | OUTPATIENT
Start: 2023-06-05 | End: 2023-06-09 | Stop reason: SDUPTHER

## 2023-06-05 NOTE — TELEPHONE ENCOUNTER
----- Message from Loulou Waggoner sent at 6/5/2023 11:23 AM CDT -----  Contact: [phone]  Patient is taking 2 tablets once daily patient would like to know if a new RX to reflect dosage increase is needed    Requesting an RX refill or new RX.  Is this a refill or new RX: refill  RX name and strength (copy/paste from chart):  metoprolol succinate (TOPROL-XL) 25 MG 24 hr tablet  Is this a 30 day or 90 day RX: 90  Pharmacy name and phone # (copy/paste from chart):    [id number] - [place] - [address] AT [facility]  [address] [address]  Phone: [phone] Fax: [phone]      The doctors have asked that we provide their patients with the following 2 reminders -- prescription refills can take up to 72 hours, and a friendly reminder that in the future you can use your [facility] account to request refills: yes

## 2023-06-05 NOTE — TELEPHONE ENCOUNTER
No care due was identified.  [facility] Embedded Care Due Messages. Reference number: [id number].   6/05/2023 11:38:42 AM CDT

## 2023-06-09 ENCOUNTER — TELEPHONE (OUTPATIENT)
Dept: INTERNAL MEDICINE | Facility: CLINIC | Age: 43
End: 2023-06-09
Payer: COMMERCIAL

## 2023-06-09 DIAGNOSIS — F41.9 ANXIETY: ICD-10-CM

## 2023-06-09 DIAGNOSIS — I10 HYPERTENSION, UNSPECIFIED TYPE: ICD-10-CM

## 2023-06-09 RX ORDER — METOPROLOL SUCCINATE 50 MG/1
50 TABLET, EXTENDED RELEASE ORAL DAILY
Qty: 90 TABLET | Refills: 3 | Status: SHIPPED | OUTPATIENT
Start: 2023-06-09 | End: 2024-06-08

## 2023-06-09 NOTE — TELEPHONE ENCOUNTER
----- Message from Nina Apple sent at 6/9/2023  2:32 PM CDT -----  Contact: [phone]  Pt is calling she states she doubled up on her blood pressure medication and she states she is out due to having to double up and she states the pharmacy will not fill cause it is to early per the instructions on previous script she states she has been out for 2 days please give return call; ASAP

## 2023-06-09 NOTE — TELEPHONE ENCOUNTER
Pt state been taking 2 tabs of Metoprolol per day as instructed and has ran out of medication and pharmacy says it's too soon to fill.     Pt needs new script.   States BP been in normal range. 112/70's

## 2023-06-09 NOTE — TELEPHONE ENCOUNTER
Patient was instructed to take 2 tablet of the 25mg and is now out of med . A new rx need to be sent reflecting the dosage change  .  Patient has been getting good control on the increase dosage .

## 2023-06-09 NOTE — TELEPHONE ENCOUNTER
----- Message from Mariibrittany Keller sent at 6/9/2023  2:24 PM CDT -----  Contact: [facility] phone [phone], fax3 [phone]  Requesting an RX refill or new RX.  Is this a refill or new RX: Refill  RX name and strength  metoprolol succinate (TOPROL-XL)   Is this a 30 day or 90 day RX: 90  Pharmacy name and phone #   [id number] - [place] - [address] AT [facility]  [address] [address]  Phone: [phone] Fax: [phone]  The doctors have asked that we provide their patients with the following 2 reminders -- prescription refills can take up to 72 hours, and a friendly reminder that in the future you can use your [facility] account to request refills:   Comment: Ms. Bowser Pond5 said that she would like for you to send in 50 MG of the patients Metoprolol

## 2023-06-15 ENCOUNTER — PATIENT MESSAGE (OUTPATIENT)
Dept: INTERNAL MEDICINE | Facility: CLINIC | Age: 43
End: 2023-06-15
Payer: COMMERCIAL

## 2023-06-16 ENCOUNTER — PATIENT OUTREACH (OUTPATIENT)
Dept: ADMINISTRATIVE | Facility: HOSPITAL | Age: 43
End: 2023-06-16
Payer: COMMERCIAL

## 2023-06-16 ENCOUNTER — PATIENT MESSAGE (OUTPATIENT)
Dept: ADMINISTRATIVE | Facility: HOSPITAL | Age: 43
End: 2023-06-16
Payer: COMMERCIAL

## 2023-06-21 RX ORDER — SERTRALINE HYDROCHLORIDE 50 MG/1
50 TABLET, FILM COATED ORAL DAILY
Qty: 30 TABLET | Refills: 11 | Status: SHIPPED | OUTPATIENT
Start: 2023-06-21

## 2023-06-30 DIAGNOSIS — N83.209 CYST OF OVARY, UNSPECIFIED LATERALITY: Primary | ICD-10-CM

## 2023-10-13 ENCOUNTER — PATIENT MESSAGE (OUTPATIENT)
Dept: ADMINISTRATIVE | Facility: HOSPITAL | Age: 43
End: 2023-10-13
Payer: COMMERCIAL

## 2023-10-13 ENCOUNTER — PATIENT OUTREACH (OUTPATIENT)
Dept: ADMINISTRATIVE | Facility: HOSPITAL | Age: 43
End: 2023-10-13
Payer: COMMERCIAL

## 2024-01-09 ENCOUNTER — PATIENT OUTREACH (OUTPATIENT)
Dept: ADMINISTRATIVE | Facility: HOSPITAL | Age: 44
End: 2024-01-09
Payer: COMMERCIAL

## 2024-01-09 NOTE — PROGRESS NOTES

## 2024-04-14 DIAGNOSIS — E06.3 HYPOTHYROIDISM DUE TO HASHIMOTO'S THYROIDITIS: ICD-10-CM

## 2024-04-14 DIAGNOSIS — E03.8 HYPOTHYROIDISM DUE TO HASHIMOTO'S THYROIDITIS: ICD-10-CM

## 2024-04-15 RX ORDER — LEVOTHYROXINE SODIUM 125 UG/1
125 TABLET ORAL
Qty: 90 TABLET | Refills: 3 | OUTPATIENT
Start: 2024-04-15

## 2024-04-29 ENCOUNTER — OFFICE VISIT (OUTPATIENT)
Dept: INTERNAL MEDICINE | Facility: CLINIC | Age: 44
End: 2024-04-29
Payer: COMMERCIAL

## 2024-04-29 ENCOUNTER — LAB VISIT (OUTPATIENT)
Dept: LAB | Facility: HOSPITAL | Age: 44
End: 2024-04-29
Payer: COMMERCIAL

## 2024-04-29 VITALS
RESPIRATION RATE: 15 BRPM | HEART RATE: 80 BPM | OXYGEN SATURATION: 97 % | TEMPERATURE: 99 F | BODY MASS INDEX: 36.68 KG/M2 | WEIGHT: 233.69 LBS | SYSTOLIC BLOOD PRESSURE: 140 MMHG | HEIGHT: 67 IN | DIASTOLIC BLOOD PRESSURE: 80 MMHG

## 2024-04-29 DIAGNOSIS — F41.9 ANXIETY: ICD-10-CM

## 2024-04-29 DIAGNOSIS — E66.9 OBESITY (BMI 30-39.9): ICD-10-CM

## 2024-04-29 DIAGNOSIS — E06.3 HYPOTHYROIDISM DUE TO HASHIMOTO'S THYROIDITIS: ICD-10-CM

## 2024-04-29 DIAGNOSIS — E06.3 HYPOTHYROIDISM DUE TO HASHIMOTO'S THYROIDITIS: Primary | ICD-10-CM

## 2024-04-29 DIAGNOSIS — E03.8 HYPOTHYROIDISM DUE TO HASHIMOTO'S THYROIDITIS: Primary | ICD-10-CM

## 2024-04-29 DIAGNOSIS — I10 HTN (HYPERTENSION), BENIGN: ICD-10-CM

## 2024-04-29 DIAGNOSIS — E03.8 HYPOTHYROIDISM DUE TO HASHIMOTO'S THYROIDITIS: ICD-10-CM

## 2024-04-29 LAB
T4 FREE SERPL-MCNC: 0.9 NG/DL (ref 0.71–1.51)
TSH SERPL DL<=0.005 MIU/L-ACNC: 3.21 UIU/ML (ref 0.4–4)

## 2024-04-29 PROCEDURE — 1159F MED LIST DOCD IN RCRD: CPT | Mod: CPTII,S$GLB,, | Performed by: NURSE PRACTITIONER

## 2024-04-29 PROCEDURE — 84439 ASSAY OF FREE THYROXINE: CPT | Performed by: NURSE PRACTITIONER

## 2024-04-29 PROCEDURE — 3075F SYST BP GE 130 - 139MM HG: CPT | Mod: CPTII,S$GLB,, | Performed by: NURSE PRACTITIONER

## 2024-04-29 PROCEDURE — 99214 OFFICE O/P EST MOD 30 MIN: CPT | Mod: S$GLB,,, | Performed by: NURSE PRACTITIONER

## 2024-04-29 PROCEDURE — 99999 PR PBB SHADOW E&M-EST. PATIENT-LVL V: CPT | Mod: PBBFAC,,, | Performed by: NURSE PRACTITIONER

## 2024-04-29 PROCEDURE — 84443 ASSAY THYROID STIM HORMONE: CPT | Performed by: NURSE PRACTITIONER

## 2024-04-29 PROCEDURE — 36415 COLL VENOUS BLD VENIPUNCTURE: CPT | Performed by: NURSE PRACTITIONER

## 2024-04-29 PROCEDURE — 3079F DIAST BP 80-89 MM HG: CPT | Mod: CPTII,S$GLB,, | Performed by: NURSE PRACTITIONER

## 2024-04-29 PROCEDURE — 3008F BODY MASS INDEX DOCD: CPT | Mod: CPTII,S$GLB,, | Performed by: NURSE PRACTITIONER

## 2024-04-29 RX ORDER — SERTRALINE HYDROCHLORIDE 50 MG/1
50 TABLET, FILM COATED ORAL DAILY
Qty: 90 TABLET | Refills: 0 | Status: SHIPPED | OUTPATIENT
Start: 2024-04-29 | End: 2024-06-14 | Stop reason: SDUPTHER

## 2024-04-29 NOTE — PROGRESS NOTES
Subjective     Patient ID: Monica Javier is a 43 y.o. female.    Chief Complaint: Medication Refill    Pt new to me, here for med check    Has been seeing a provider for semaglutide injections for weight loss. They did labs on her and told her that her thyroid was off. She showed me the results in her phone from [facility]. TSH was 6.524. Reflex frree T4 not done. She has had hypothyroidism due to Hashimoto since age 18. Currently on levothyroxine 125mcg daily. Saw Dr. Harmon years ago in Endocrine. Has had issues with fatigue and always being tired    Also tells me her BP has been elevated at the appts with the other provider, had a SBP in the 180s, went down to 160s when they rechecked. On Toprol XL 50mg daily for BP.    No established PCP use to see Dr. John but she states she is no longer here. Last provider she saw months ago was Kevin molina NP.     She use to take zoloft 50mg daily for anxiety. Wants to restart that.    Review of Systems   Constitutional:  Positive for fatigue and unexpected weight change. Negative for activity change, chills, diaphoresis and fever.   Respiratory:  Negative for chest tightness and shortness of breath.    Cardiovascular:  Negative for chest pain.   Gastrointestinal:  Negative for abdominal pain, constipation, diarrhea, nausea and vomiting.   Endocrine: Negative for cold intolerance, heat intolerance, polydipsia, polyphagia and polyuria.   Genitourinary:  Negative for dysuria and hematuria.   Musculoskeletal:  Negative for arthralgias, back pain and myalgias.   Allergic/Immunologic: Negative for environmental allergies, food allergies and immunocompromised state.   Neurological:  Negative for dizziness, syncope, weakness, light-headedness and headaches.   Psychiatric/Behavioral:  Negative for suicidal ideas. The patient is nervous/anxious.             Review of patient's allergies indicates:   Allergen Reactions    Wellbutrin [bupropion hcl] Hives         Current  Outpatient Medications:     ALPRAZolam (XANAX) 0.5 MG tablet, TAKE 0.5 TABLETS BY MOUTH 2 TIMES DAILY AS NEEDED FOR ANXIETY., Disp: 30 tablet, Rfl: 1    metoprolol succinate (TOPROL-XL) 50 MG 24 hr tablet, Take 1 tablet (50 mg total) by mouth once daily., Disp: 90 tablet, Rfl: 3    mv,Ca,min/iron/FA/guarana/caff (ONE-A-DAY WOMEN'S ACTIVE ORAL), Take by mouth., Disp: , Rfl:     levothyroxine (SYNTHROID) 125 MCG tablet, Take 1 tablet (125 mcg total) by mouth before breakfast., Disp: 30 tablet, Rfl: 11    Patient Active Problem List   Diagnosis    Hypothyroidism due to Hashimoto's thyroiditis    Congenital uterine anomaly    Primary hypercoagulable state    Menorrhagia with regular cycle    Dysmenorrhea    Multiple thyroid nodules    BMI 34.0-34.9,adult       Past Medical History:   Diagnosis Date    Asthma     Bicornuate uterus     History of MTHFR mutation     1 copy of MTHFR C677T mutation 1 copy MTHFR [id number] mutation    Hypothyroidism     Hypo    Thrombophilia     1 copy of MTHFR C677T mutation 1 copy MTHFR [id number] mutation       Past Surgical History:   Procedure Laterality Date     SECTION  , 2014    x 2    DILATION AND CURETTAGE OF UTERUS  2011    Partial Mole    DILATION AND CURETTAGE OF UTERUS  10/08/2012    Missed Ab (46 XX karatype)    TUBAL LIGATION      with last delivery        Social History     Socioeconomic History    Marital status:    Tobacco Use    Smoking status: Never    Smokeless tobacco: Never   Substance and Sexual Activity    Alcohol use: Yes     Alcohol/week: 3.0 standard drinks of alcohol     Types: 3 Glasses of wine per week     Comment: Social     Drug use: No    Sexual activity: Yes     Partners: Male     Birth control/protection: Surgical     Comment: :    BTL       Social Determinants of Health     Financial Resource Strain: Patient Declined (2024)    Overall Financial Resource Strain (CARDIA)     Difficulty of Paying Living Expenses: Patient  "declined   Food Insecurity: Patient Declined (4/29/2024)    Hunger Vital Sign     Worried About Running Out of Food in the Last Year: Patient declined     Ran Out of Food in the Last Year: Patient declined   Transportation Needs: No Transportation Needs (4/29/2024)    PRAPARE - Transportation     Lack of Transportation (Medical): No     Lack of Transportation (Non-Medical): No   Physical Activity: Inactive (4/29/2024)    Exercise Vital Sign     Days of Exercise per Week: 0 days     Minutes of Exercise per Session: 0 min   Stress: Stress Concern Present (4/29/2024)    [employer] - Occupational Stress Questionnaire     Feeling of Stress : Rather much   Social Connections: Unknown (4/29/2024)    Social Connection and Isolation Panel [NHANES]     Frequency of Communication with Friends and Family: More than three times a week     Frequency of Social Gatherings with Friends and Family: Once a week     Active Member of Clubs or Organizations: No     Attends Club or Organization Meetings: Never     Marital Status:    Housing Stability: Unknown (4/29/2024)    Housing Stability Vital Sign     Unable to Pay for Housing in the Last Year: Patient declined       Family History   Problem Relation Name Age of Onset    Diabetes Father Geovani Musa     Hypertension Father Geovani Musa     Hyperlipidemia Father Geovani Musa     Diabetes Sister Ramiro Gonzalez         juvenile    Kidney failure Sister Ramiro Gonzalez     Cancer Paternal Grandfather Geovani Musa         throat; smoker and alcoholic    Throat cancer Paternal Grandfather Geovani Musa     Hypertension Mother Nuria Musa     Breast cancer Neg Hx      Colon cancer Neg Hx      Ovarian cancer Neg Hx         Objective     Vitals:    04/29/24 1155 04/29/24 1214   BP: (!) 138/96 (!) 140/80   Pulse: 80    Resp: 15    Temp: 98.9 °F (37.2 °C)    TempSrc: Oral    SpO2: 97%    Weight: 106 kg (233 lb 11 oz)    Height: 5' 7" (1.702 m)    PainSc: 0-No pain      Body mass " index is 36.6 kg/m².    Physical Exam  Vitals and nursing note reviewed.   Constitutional:       Appearance: She is obese.   HENT:      Head: Normocephalic.      Nose: Nose normal.      Mouth/Throat:      Mouth: Mucous membranes are moist.   Eyes:      Conjunctiva/sclera: Conjunctivae normal.   Cardiovascular:      Rate and Rhythm: Normal rate and regular rhythm.      Pulses: Normal pulses.      Heart sounds: Normal heart sounds.   Pulmonary:      Effort: Pulmonary effort is normal.      Breath sounds: Normal breath sounds.   Musculoskeletal:         General: Normal range of motion.      Cervical back: Normal range of motion.   Skin:     General: Skin is warm and dry.   Neurological:      General: No focal deficit present.      Mental Status: She is alert and oriented to person, place, and time.   Psychiatric:         Mood and Affect: Mood normal.         Behavior: Behavior normal.         Thought Content: Thought content normal.         Judgment: Judgment normal.            Assessment and Plan     1. Hypothyroidism due to Hashimoto's thyroiditis  -     T4, Free; Future; Expected date: 04/29/2024  -     Ambulatory referral/consult to Endocrinology; Future; Expected date: 04/29/2024  -     TSH; Future; Expected date: 04/29/2024    2. BMI 36.0-36.9,adult  BMI reviewed    3. Obesity (BMI 30-39.9)  BMI reviewed.    Diet and exercise to lose weight.    4. HTN (hypertension), benign  Continue metoprolol for now    Let's see if the weight loss drops the BP    5. Anxiety  -     sertraline (ZOLOFT) 50 MG tablet; Take 1 tablet (50 mg total) by mouth once daily.  Dispense: 90 tablet; Refill: 0    Check Free T4 to see if dose adjustment needed on levothyroxine    On thyroid meds, advised to take separate from other meds and vitamins    Take medications as prescribed.    Monitor BP at home, goal BP < or = 140/80, call office if consistently above this range.    Follow low salt DASH diet and exercise.    BMI reviewed.    Go to ED  if Headaches, blurred vision, chest pain, or SOB occurs along with elevated readings > or = 160/90.    Referral to Dr. Harmon Endocrine    Refilled Zoloft 50 mg daily for anxiety    Message me in 4 weeks how you are doing to see if dose needs adjustments    Self care instructions provided in AVS             Follow up in about 6 months (around 10/29/2024) for with me or my practice partner Dr. Gregorio.

## 2024-04-29 NOTE — PATIENT INSTRUCTIONS
Check Free T4 to see if dose adjustment needed on levothyroxine    On thyroid meds, advised to take separate from other meds and vitamins    Take medications as prescribed.    Monitor BP at home, goal BP < or = 140/80, call office if consistently above this range.    Follow low salt DASH diet and exercise.    BMI reviewed.    Go to ED if Headaches, blurred vision, chest pain, or SOB occurs along with elevated readings > or = 160/90.    Referral to Dr. Harmon Endocrine    Refilled Zoloft 50 mg daily for anxiety    Message me in 4 weeks how you are doing to see if dose needs adjustments

## 2024-04-30 ENCOUNTER — PATIENT MESSAGE (OUTPATIENT)
Dept: INTERNAL MEDICINE | Facility: CLINIC | Age: 44
End: 2024-04-30
Payer: COMMERCIAL

## 2024-04-30 DIAGNOSIS — E06.3 HYPOTHYROIDISM DUE TO HASHIMOTO'S THYROIDITIS: ICD-10-CM

## 2024-04-30 DIAGNOSIS — E03.8 HYPOTHYROIDISM DUE TO HASHIMOTO'S THYROIDITIS: ICD-10-CM

## 2024-04-30 RX ORDER — LEVOTHYROXINE SODIUM 125 UG/1
125 TABLET ORAL
Qty: 90 TABLET | Refills: 3 | Status: SHIPPED | OUTPATIENT
Start: 2024-04-30 | End: 2025-04-30

## 2024-06-04 ENCOUNTER — TELEPHONE (OUTPATIENT)
Dept: INTERNAL MEDICINE | Facility: CLINIC | Age: 44
End: 2024-06-04
Payer: COMMERCIAL

## 2024-06-09 DIAGNOSIS — I10 HYPERTENSION, UNSPECIFIED TYPE: ICD-10-CM

## 2024-06-09 DIAGNOSIS — F41.9 ANXIETY: ICD-10-CM

## 2024-06-11 ENCOUNTER — TELEPHONE (OUTPATIENT)
Dept: INTERNAL MEDICINE | Facility: CLINIC | Age: 44
End: 2024-06-11
Payer: COMMERCIAL

## 2024-06-11 RX ORDER — METOPROLOL SUCCINATE 50 MG/1
50 TABLET, EXTENDED RELEASE ORAL DAILY
Qty: 90 TABLET | Refills: 3 | OUTPATIENT
Start: 2024-06-11 | End: 2025-06-11

## 2024-06-11 NOTE — TELEPHONE ENCOUNTER
----- Message from Ange Keen sent at 6/11/2024 11:35 AM CDT -----  Contact: [phone]  Requesting an RX refill or new RX.  Is this a refill or new RX:   RX name and strength metoprolol succinate (TOPROL-XL) 50 MG 24 hr tablet  Is this a 30 day or 90 day RX: 30  Pharmacy name and phone #       [id number] [place] - [address] AT [facility]  [address] [address]  Phone: [phone] Fax: [phone]    Patient states Is out of the medication and is scheduled to see Dr. Dunlap.

## 2024-06-11 NOTE — TELEPHONE ENCOUNTER
Provider Staff- Action is required     If a refill request needs assessment, Please pend appropriate medication(s) for patient and route the refill request to the Centralized Refill Staff Pool for assessment.     If medication is already pended in a previous encounter, please add any call/ encounter notes to that previous encounter and route that encounter to the ORC staff pool High Priority.     If medication is not pended as a Refill Request the data required for the Refill Center to assess will not generate and the medications will not be able to be assessed properly by the Refill Center.     Thank you for your assistance!   [facility]     Note composed:11:44 AM 06/11/2024

## 2024-06-14 ENCOUNTER — LAB VISIT (OUTPATIENT)
Dept: LAB | Facility: HOSPITAL | Age: 44
End: 2024-06-14
Attending: INTERNAL MEDICINE
Payer: COMMERCIAL

## 2024-06-14 ENCOUNTER — OFFICE VISIT (OUTPATIENT)
Dept: INTERNAL MEDICINE | Facility: CLINIC | Age: 44
End: 2024-06-14
Payer: COMMERCIAL

## 2024-06-14 VITALS
HEART RATE: 85 BPM | DIASTOLIC BLOOD PRESSURE: 84 MMHG | WEIGHT: 221.81 LBS | SYSTOLIC BLOOD PRESSURE: 130 MMHG | OXYGEN SATURATION: 99 % | TEMPERATURE: 97 F | HEIGHT: 67 IN | BODY MASS INDEX: 34.81 KG/M2

## 2024-06-14 DIAGNOSIS — D68.59 PRIMARY HYPERCOAGULABLE STATE: ICD-10-CM

## 2024-06-14 DIAGNOSIS — Z00.00 ROUTINE MEDICAL EXAM: Primary | ICD-10-CM

## 2024-06-14 DIAGNOSIS — Z00.00 ROUTINE MEDICAL EXAM: ICD-10-CM

## 2024-06-14 DIAGNOSIS — I10 HYPERTENSION, UNSPECIFIED TYPE: ICD-10-CM

## 2024-06-14 DIAGNOSIS — F41.9 ANXIETY: ICD-10-CM

## 2024-06-14 DIAGNOSIS — Z12.31 VISIT FOR SCREENING MAMMOGRAM: ICD-10-CM

## 2024-06-14 LAB
ALBUMIN SERPL BCP-MCNC: 4.1 G/DL (ref 3.5–5.2)
ALP SERPL-CCNC: 68 U/L (ref 55–135)
ALT SERPL W/O P-5'-P-CCNC: 23 U/L (ref 10–44)
ANION GAP SERPL CALC-SCNC: 10 MMOL/L (ref 8–16)
AST SERPL-CCNC: 20 U/L (ref 10–40)
BASOPHILS # BLD AUTO: 0.05 K/UL (ref 0–0.2)
BASOPHILS NFR BLD: 0.7 % (ref 0–1.9)
BILIRUB SERPL-MCNC: 0.5 MG/DL (ref 0.1–1)
BUN SERPL-MCNC: 9 MG/DL (ref 6–20)
CALCIUM SERPL-MCNC: 9.3 MG/DL (ref 8.7–10.5)
CHLORIDE SERPL-SCNC: 107 MMOL/L (ref 95–110)
CHOLEST SERPL-MCNC: 162 MG/DL (ref 120–199)
CHOLEST/HDLC SERPL: 4.8 {RATIO} (ref 2–5)
CO2 SERPL-SCNC: 23 MMOL/L (ref 23–29)
CREAT SERPL-MCNC: 0.7 MG/DL (ref 0.5–1.4)
CRP SERPL-MCNC: 1.94 MG/L (ref 0–3.19)
DIFFERENTIAL METHOD BLD: ABNORMAL
EOSINOPHIL # BLD AUTO: 0.1 K/UL (ref 0–0.5)
EOSINOPHIL NFR BLD: 1.7 % (ref 0–8)
ERYTHROCYTE [DISTWIDTH] IN BLOOD BY AUTOMATED COUNT: 12 % (ref 11.5–14.5)
EST. GFR  (NO RACE VARIABLE): >60 ML/MIN/1.73 M^2
ESTIMATED AVG GLUCOSE: 88 MG/DL (ref 68–131)
GLUCOSE SERPL-MCNC: 90 MG/DL (ref 70–110)
HBA1C MFR BLD: 4.7 % (ref 4–5.6)
HCT VFR BLD AUTO: 38.1 % (ref 37–48.5)
HDLC SERPL-MCNC: 34 MG/DL (ref 40–75)
HDLC SERPL: 21 % (ref 20–50)
HGB BLD-MCNC: 12.9 G/DL (ref 12–16)
IMM GRANULOCYTES # BLD AUTO: 0.02 K/UL (ref 0–0.04)
IMM GRANULOCYTES NFR BLD AUTO: 0.3 % (ref 0–0.5)
LDLC SERPL CALC-MCNC: 91.8 MG/DL (ref 63–159)
LYMPHOCYTES # BLD AUTO: 1.8 K/UL (ref 1–4.8)
LYMPHOCYTES NFR BLD: 25.7 % (ref 18–48)
MCH RBC QN AUTO: 31.5 PG (ref 27–31)
MCHC RBC AUTO-ENTMCNC: 33.9 G/DL (ref 32–36)
MCV RBC AUTO: 93 FL (ref 82–98)
MONOCYTES # BLD AUTO: 0.6 K/UL (ref 0.3–1)
MONOCYTES NFR BLD: 8.8 % (ref 4–15)
NEUTROPHILS # BLD AUTO: 4.3 K/UL (ref 1.8–7.7)
NEUTROPHILS NFR BLD: 62.8 % (ref 38–73)
NONHDLC SERPL-MCNC: 128 MG/DL
NRBC BLD-RTO: 0 /100 WBC
PLATELET # BLD AUTO: 340 K/UL (ref 150–450)
PMV BLD AUTO: 10.1 FL (ref 9.2–12.9)
POTASSIUM SERPL-SCNC: 3.7 MMOL/L (ref 3.5–5.1)
PROT SERPL-MCNC: 7.3 G/DL (ref 6–8.4)
RBC # BLD AUTO: 4.09 M/UL (ref 4–5.4)
SODIUM SERPL-SCNC: 140 MMOL/L (ref 136–145)
TRIGL SERPL-MCNC: 181 MG/DL (ref 30–150)
TSH SERPL DL<=0.005 MIU/L-ACNC: 1.4 UIU/ML (ref 0.4–4)
WBC # BLD AUTO: 6.9 K/UL (ref 3.9–12.7)

## 2024-06-14 PROCEDURE — 84443 ASSAY THYROID STIM HORMONE: CPT | Performed by: INTERNAL MEDICINE

## 2024-06-14 PROCEDURE — 80061 LIPID PANEL: CPT | Performed by: INTERNAL MEDICINE

## 2024-06-14 PROCEDURE — 99396 PREV VISIT EST AGE 40-64: CPT | Mod: S$GLB,,, | Performed by: INTERNAL MEDICINE

## 2024-06-14 PROCEDURE — 36415 COLL VENOUS BLD VENIPUNCTURE: CPT | Mod: PO | Performed by: INTERNAL MEDICINE

## 2024-06-14 PROCEDURE — 99999 PR PBB SHADOW E&M-EST. PATIENT-LVL III: CPT | Mod: PBBFAC,,, | Performed by: INTERNAL MEDICINE

## 2024-06-14 PROCEDURE — 1160F RVW MEDS BY RX/DR IN RCRD: CPT | Mod: CPTII,S$GLB,, | Performed by: INTERNAL MEDICINE

## 2024-06-14 PROCEDURE — 3008F BODY MASS INDEX DOCD: CPT | Mod: CPTII,S$GLB,, | Performed by: INTERNAL MEDICINE

## 2024-06-14 PROCEDURE — 83036 HEMOGLOBIN GLYCOSYLATED A1C: CPT | Performed by: INTERNAL MEDICINE

## 2024-06-14 PROCEDURE — 1159F MED LIST DOCD IN RCRD: CPT | Mod: CPTII,S$GLB,, | Performed by: INTERNAL MEDICINE

## 2024-06-14 PROCEDURE — 86141 C-REACTIVE PROTEIN HS: CPT | Performed by: INTERNAL MEDICINE

## 2024-06-14 PROCEDURE — 3079F DIAST BP 80-89 MM HG: CPT | Mod: CPTII,S$GLB,, | Performed by: INTERNAL MEDICINE

## 2024-06-14 PROCEDURE — 85025 COMPLETE CBC W/AUTO DIFF WBC: CPT | Performed by: INTERNAL MEDICINE

## 2024-06-14 PROCEDURE — 80053 COMPREHEN METABOLIC PANEL: CPT | Performed by: INTERNAL MEDICINE

## 2024-06-14 PROCEDURE — 3044F HG A1C LEVEL LT 7.0%: CPT | Mod: CPTII,S$GLB,, | Performed by: INTERNAL MEDICINE

## 2024-06-14 PROCEDURE — 3075F SYST BP GE 130 - 139MM HG: CPT | Mod: CPTII,S$GLB,, | Performed by: INTERNAL MEDICINE

## 2024-06-14 RX ORDER — SERTRALINE HYDROCHLORIDE 50 MG/1
50 TABLET, FILM COATED ORAL DAILY
Qty: 90 TABLET | Refills: 0 | Status: SHIPPED | OUTPATIENT
Start: 2024-06-14

## 2024-06-14 RX ORDER — CARVEDILOL 6.25 MG/1
6.25 TABLET ORAL 2 TIMES DAILY WITH MEALS
Qty: 60 TABLET | Refills: 1 | Status: SHIPPED | OUTPATIENT
Start: 2024-06-14 | End: 2025-06-14

## 2024-06-14 NOTE — PROGRESS NOTES
The patient is a 43 y.o. old female with primary hypercoagulable state who presents to the office for a physical.    PAST MEDICAL HISTORY  Past Medical History:   Diagnosis Date    Asthma     Bicornuate uterus     History of MTHFR mutation     1 copy of MTHFR C677T mutation 1 copy MTHFR [id number] mutation    Hypothyroidism     Hypo    Thrombophilia     1 copy of MTHFR C677T mutation 1 copy MTHFR [id number] mutation       SURGICAL HISTORY:  Past Surgical History:   Procedure Laterality Date     SECTION  , 2014    x 2    DILATION AND CURETTAGE OF UTERUS  2011    Partial Mole    DILATION AND CURETTAGE OF UTERUS  10/08/2012    Missed Ab (46 XX karatype)    TUBAL LIGATION      with last delivery          MEDS:  Medcard reviewed and updated    ALLERGIES: Allergy Card reviewed and updated    SOCIAL HISTORY:   The patient is a nonsmoker, denies alcohol or illicit drug use.    ROS:  GENERAL: No fever, chills or weight loss.  Positive fatigue.  SKIN: No rashes.  HEAD: Occasional headaches.  Denies recent head trauma.  EYES: No photophobia, ocular pain or diplopia.  EARS: Denies ear pain, discharge or vertigo.  NOSE: No epistaxis or postnasal drip.  MOUTH & THROAT: No hoarseness or change in voice.   NODES: Denies swollen glands.  CHEST: Denies shortness of breath, wheezing, cough and sputum production.  CARDIOVASCULAR: Denies chest pain or palpitations.  ABDOMEN: Appetite fine. Denies diarrhea, abdominal pain, occasional constipation, denies blood in stool.  URINARY: No dysuria or hematuria.  MUSCULOSKELETAL: No joint stiffness or swelling. Positive back pain.  NEUROLOGIC: No history of seizures.  ENDOCRINE: Positive polyuria.  Denies polydipsia.  PSYCHIATRIC: Denies mood swings, depression, homicidal or suicidal thoughts.  Positive anxiety.    SCREENINGS:  Last cholesterol: .  Last colonoscopy: none  Last mammogram: none  Last Pap smear:   Last tetanus: , defer  Last Pneumovax: none  Last eye exam:  2 years ago  Last bone density: none  Last menstrual period: June 12, 2024    PE:   Vitals:  Vitals:    06/14/24 1013   BP: (!) 140/86   Pulse: 85   Temp: 97.4 °F (36.3 °C)       APPEARANCE: Well nourished, well developed, in no acute distress.    EYES: Sclerae anicteric. PERRL. EOMI.      EARS: TM's intact. No retraction or perforation.    NOSE: Mucosa pink. Airway clear.  MOUTH & THROAT: No tonsillar enlargement. No pharyngeal erythema or exudate. No stridor.  NECK: Supple, no thyromegaly.  CHEST: Lungs clear to auscultation with unlabored respirations.  CARDIOVASCULAR: Normal S1, S2. No murmurs. No carotid bruits. No pedal edema.  ABDOMEN: Bowel sounds normal. Not distended. Soft. No tenderness or masses.   MUSCULOSKELETAL:  Normal gait, no cyanosis or clubbing.   SKIN: Normal skin turgor.  Warm and   NEUROLOGIC: Cranial Nerves: Intact  PSYCHIATRIC: The patient is oriented to person, place, and time and has a pleasant affect.        ASSESSMENT/PLAN:  Monica was seen today for establish care, hypertension, medication refill and headache.    Diagnoses and all orders for this visit:    Routine medical exam  -     CBC Auto Differential; Future  -     Comprehensive Metabolic Panel; Future  -     Lipid Panel; Future  -     TSH; Future  -     Hemoglobin A1C; Future  -     Urinalysis; Future  -     CRP, High Sensitivity; Future    Hypertension, unspecified type  -     blood pressure is controlled  -     discontinue metoprolol and start carvedilol    Anxiety  -     sertraline (ZOLOFT) 50 MG tablet; Take 1 tablet (50 mg total) by mouth once daily.    Visit for screening mammogram  -     Mammo Digital Screening Bilat w/ Mark; Future    Primary hypercoagulable state  -     stable, monitor    Other orders  -     carvediloL (COREG) 6.25 MG tablet; Take 1 tablet (6.25 mg total) by mouth 2 (two) times daily with meals.  -     semaglutide (OZEMPIC) 0.25 mg or 0.5 mg (2 mg/3 mL) pen injector; Inject 0.5 mg into the skin every 7  days.

## 2024-06-28 ENCOUNTER — PATIENT MESSAGE (OUTPATIENT)
Dept: INTERNAL MEDICINE | Facility: CLINIC | Age: 44
End: 2024-06-28
Payer: COMMERCIAL

## 2024-07-08 ENCOUNTER — TELEPHONE (OUTPATIENT)
Dept: INTERNAL MEDICINE | Facility: CLINIC | Age: 44
End: 2024-07-08
Payer: COMMERCIAL

## 2024-07-08 DIAGNOSIS — I10 HYPERTENSION, UNSPECIFIED TYPE: ICD-10-CM

## 2024-07-08 DIAGNOSIS — F41.9 ANXIETY: ICD-10-CM

## 2024-07-08 RX ORDER — METOPROLOL SUCCINATE 50 MG/1
50 TABLET, EXTENDED RELEASE ORAL DAILY
Qty: 90 TABLET | Refills: 3 | Status: SHIPPED | OUTPATIENT
Start: 2024-07-08 | End: 2025-07-08

## 2024-07-08 NOTE — TELEPHONE ENCOUNTER
----- Message from Ne Nagel sent at 7/8/2024  1:38 PM CDT -----  Contact: patient  Hi Dr. Dunlap, I was unable to make my blood pressure check on the 26th, but I have been recording my results daily.  143/96  145/99  148/101  144/103  146/98  144/98  147/101  137/100  140/102     They are still high and that concerns me.      Thanks, Monica Javier      Patient reports BP today is 145/100 -Patients  would like to go back to initial medication

## 2024-07-08 NOTE — TELEPHONE ENCOUNTER
Please inform patient that prescription for metoprolol has been sent to her pharmacy electronically.  However, she has on a relatively low dose of carvedilol.  If she would like to try increasing the dose to 12.5 mg twice a day 1st, she can do so.  If not, prescription for metoprolol has been sent to the pharmacy.

## 2024-07-09 NOTE — TELEPHONE ENCOUNTER
Spoke to pt informed pt metoprolol was sent to her pharmacy. If she wanted she can increase dos to 12.5 mg before trying Metoprolol if she wants pt vu

## 2025-03-19 ENCOUNTER — TELEPHONE (OUTPATIENT)
Dept: INTERNAL MEDICINE | Facility: CLINIC | Age: 45
End: 2025-03-19
Payer: COMMERCIAL

## 2025-03-19 NOTE — TELEPHONE ENCOUNTER
----- Message from [place] sent at 3/19/2025 11:42 AM CDT -----  Contact: [phone]    ----- Message -----  From: Jon Reddy  Sent: 3/19/2025  11:35 AM CDT  To: [facility] Staff    .1MEDICALADVICE Patient is calling for Medical Advice regarding: BP has been around 150/110, wants increased dosagePatient wants a call back or thru [facility]: callComments:Please advise patient replies from provider may take up to 48 hours.

## 2025-03-20 ENCOUNTER — TELEPHONE (OUTPATIENT)
Dept: INTERNAL MEDICINE | Facility: CLINIC | Age: 45
End: 2025-03-20
Payer: COMMERCIAL

## 2025-03-20 NOTE — TELEPHONE ENCOUNTER
Please advise patient that we can increase her metoprolol to 100 mg daily.  However, we have to be mindful of her heart rate.  Because metoprolol slows the heart rate, increasing the medication dosage good cause additional problems.  I recommend patient schedule an appointment to be evaluated.  She can take 2 metoprolol a day, but she needs to monitor her heart rate.  Her heart rate should be above 60 while on the medication.

## 2025-03-20 NOTE — TELEPHONE ENCOUNTER
Patient advised that we can increase her metoprolol to 100 mg daily. However, we have to be mindful of her heart rate. Because metoprolol slows the heart rate, increasing the medication dosage good cause additional problems. Dr. Dunlap recommend that you schedule an appointment to be evaluated. You can take 2 metoprolol a day, but you need to monitor your heart rate. Your heart rate should be above 60 while on the medication. Pt scheduled appt

## 2025-03-21 ENCOUNTER — PATIENT MESSAGE (OUTPATIENT)
Dept: INTERNAL MEDICINE | Facility: CLINIC | Age: 45
End: 2025-03-21
Payer: COMMERCIAL

## 2025-03-21 DIAGNOSIS — I10 HYPERTENSION, UNSPECIFIED TYPE: ICD-10-CM

## 2025-03-21 DIAGNOSIS — F41.9 ANXIETY: ICD-10-CM

## 2025-03-22 NOTE — TELEPHONE ENCOUNTER
Pt is requesting a refill on her metoprolol, states she spoke to nurse and they were supposed to be sending in.     LOV with Shi Dunlap MD , 6/14/2024

## 2025-03-22 NOTE — TELEPHONE ENCOUNTER
Care Due:                  Date            Visit Type   Department     Provider  --------------------------------------------------------------------------------                                EP -                              PRIMARY      MET INTERNAL  Last Visit: 06-      CARE ([facility])   MEDICINE       Shi Dunlap                              EP -                              PRIMARY      [facility] INTERNAL  Next Visit: 04-      CARE ([facility])   MEDICINE       Shi Dunlap                                                            Last  Test          Frequency    Reason                     Performed    Due Date  --------------------------------------------------------------------------------    HBA1C.......  6 months...  semaglutide..............  06- 12-    Health [facility] Embedded Care Due Messages. Reference number: [id number].   3/22/2025 2:42:36 PM CDT

## 2025-03-24 RX ORDER — METOPROLOL SUCCINATE 50 MG/1
50 TABLET, EXTENDED RELEASE ORAL DAILY
Qty: 90 TABLET | Refills: 3 | Status: SHIPPED | OUTPATIENT
Start: 2025-03-24 | End: 2026-03-24

## 2025-03-26 ENCOUNTER — PATIENT MESSAGE (OUTPATIENT)
Dept: INTERNAL MEDICINE | Facility: CLINIC | Age: 45
End: 2025-03-26
Payer: COMMERCIAL

## 2025-03-27 ENCOUNTER — TELEPHONE (OUTPATIENT)
Dept: INTERNAL MEDICINE | Facility: CLINIC | Age: 45
End: 2025-03-27
Payer: COMMERCIAL

## 2025-03-27 NOTE — TELEPHONE ENCOUNTER
Spoke to pt and we cleared up the metoprlol issue per the message on 03/20/2025.  said take 2 metoprlol to [id number] daily. Check heart rate and make sure it stays under 60. Pt gave a verbal and will keep us updated.

## 2025-03-27 NOTE — TELEPHONE ENCOUNTER
----- Message from Ne sent at 3/27/2025 12:50 PM CDT -----  Contact: Patient  [phone]  .1MEDICALADVICE Patient is calling for Medical Advice regarding:please call regarding medication, dosages etc- How long has patient had these symptoms:Pharmacy name and phone#:[facility] call back [facility]:Comments:Please advise patient replies from provider may take up to 48 hours.

## 2025-05-08 DIAGNOSIS — F41.9 ANXIETY: ICD-10-CM

## 2025-05-08 RX ORDER — SERTRALINE HYDROCHLORIDE 50 MG/1
50 TABLET, FILM COATED ORAL
Qty: 90 TABLET | Refills: 0 | Status: SHIPPED | OUTPATIENT
Start: 2025-05-08

## 2025-05-08 NOTE — TELEPHONE ENCOUNTER
No care due was identified.  Health [facility] Embedded Care Due Messages. Reference number: [id number].   5/08/2025 11:12:06 AM CDT

## 2025-05-08 NOTE — TELEPHONE ENCOUNTER
Refill Decision Note   Monica Concepcion  is requesting a refill authorization.  Brief Assessment and Rationale for Refill:  Approve     Medication Therapy Plan:        Comments:     Note composed:11:38 AM 05/08/2025

## 2025-05-15 DIAGNOSIS — F41.9 ANXIETY: ICD-10-CM

## 2025-05-15 DIAGNOSIS — I10 HYPERTENSION, UNSPECIFIED TYPE: ICD-10-CM

## 2025-05-15 RX ORDER — METOPROLOL SUCCINATE 50 MG/1
50 TABLET, EXTENDED RELEASE ORAL DAILY
Qty: 90 TABLET | Refills: 0 | Status: SHIPPED | OUTPATIENT
Start: 2025-05-15 | End: 2025-05-16

## 2025-05-15 NOTE — TELEPHONE ENCOUNTER
Refill Decision Note   Monica Concepcion  is requesting a refill authorization.  Brief Assessment and Rationale for Refill:  Approve     Medication Therapy Plan:         Comments:     Note composed:3:44 PM 05/15/2025

## 2025-05-15 NOTE — TELEPHONE ENCOUNTER
No care due was identified.  [facility] Embedded Care Due Messages. Reference number: [id number].   5/15/2025 2:49:53 PM CDT

## 2025-05-16 RX ORDER — METOPROLOL SUCCINATE 100 MG/1
100 TABLET, EXTENDED RELEASE ORAL DAILY
Qty: 90 TABLET | Refills: 3 | Status: SHIPPED | OUTPATIENT
Start: 2025-05-16

## 2025-05-16 RX ORDER — METOPROLOL SUCCINATE 100 MG/1
100 TABLET, EXTENDED RELEASE ORAL DAILY
COMMUNITY
End: 2025-05-16 | Stop reason: SDUPTHER

## 2025-05-16 NOTE — TELEPHONE ENCOUNTER
Spoke to pt and per our conversation in March :      per the message on 03/20/2025.  said take 2 metoprlol to [id number] daily.     Please resend the Metoprolol 100mg 1 tablet once a day to [facility].    Pt would like a call back once new med is sent

## 2025-05-16 NOTE — TELEPHONE ENCOUNTER
No care due was identified.  Health [facility] Embedded Care Due Messages. Reference number: [id number].   5/16/2025 9:42:30 AM CDT

## 2025-08-18 DIAGNOSIS — E06.3 HYPOTHYROIDISM DUE TO HASHIMOTO'S THYROIDITIS: ICD-10-CM

## 2025-08-18 RX ORDER — LEVOTHYROXINE SODIUM 125 UG/1
125 TABLET ORAL
Qty: 90 TABLET | Refills: 3 | Status: SHIPPED | OUTPATIENT
Start: 2025-08-18 | End: 2026-08-18

## 2025-08-21 DIAGNOSIS — Z12.31 OTHER SCREENING MAMMOGRAM: ICD-10-CM
